# Patient Record
Sex: FEMALE | Race: WHITE | URBAN - METROPOLITAN AREA
[De-identification: names, ages, dates, MRNs, and addresses within clinical notes are randomized per-mention and may not be internally consistent; named-entity substitution may affect disease eponyms.]

---

## 2017-02-28 ENCOUNTER — TELEPHONE (OUTPATIENT)
Dept: FAMILY MEDICINE | Facility: CLINIC | Age: 32
End: 2017-02-28

## 2017-02-28 NOTE — TELEPHONE ENCOUNTER
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 The letter should state the date that she started the medication.   Please fax to [facility] attn: Og Mendoza [phone].

## 2017-02-28 NOTE — TELEPHONE ENCOUNTER
Patient calling, she would rather come in and pick it up instead of having the letter faxed. Please call when ready.

## 2017-02-28 NOTE — LETTER
[facility]  [address] [facility] [id number] [phone]      RE: Abigail Lopez [birth year]      To Whom it May Concern:      Abigail Lopez is a patient of mine at [facility]. She is taking effexor for treatment of anxiety and depression. She started taking this medication in 2014 and it has not caused impairment with her driving. I advise she continue taking this for mood stabilization.         Sincerely,         Kristen Kehr PA-C

## 2017-02-28 NOTE — LETTER
[facility] [place]  [address] [facility] [id number] [phone]      RE: Abigail Lopez [birth year]      To Whom it May Concern:                    Sincerely,         Kristen Kehr PA-C

## 2017-03-13 ENCOUNTER — TELEPHONE (OUTPATIENT)
Dept: FAMILY MEDICINE | Facility: CLINIC | Age: 32
End: 2017-03-13

## 2017-03-13 NOTE — LETTER
[facility]                                                   [address] [place]  [address]    March 20, 2017    Abigail Lopez  [address]   [place] [address]        Dear Abigail,       After reviewing your chart, I have determined you are due for a PHQ-9 questionnaire. The PHQ-9 is a nine question survey tool that helps us determine how your depression is doing based on the last two weeks. Please complete the questionnaire and return in the envelope provided.          Thank you,   [place]hell  [phone]

## 2017-03-13 NOTE — TELEPHONE ENCOUNTER
PHQ-9 due now for patient ( 5-7 months from index date)  Index date:       9-28-16  Index PHQ9 :   12  FU start date:   2-28-17  FU End date :   4-28-17    RN- Contact patient for PHQ-9. Remission considered if follow up PHQ-9 less than 5.  If greater than 5 consider follow up appointment, e-visit for medication follow up and evaluation.

## 2017-03-14 NOTE — TELEPHONE ENCOUNTER
Left message on answering machine for patient/parent to call back.   [phone].  Yvette Avila RN

## 2017-03-17 NOTE — TELEPHONE ENCOUNTER
Left message on answering machine for patient/parent to call back.   [phone].  Yvette Avila RN

## 2017-03-20 NOTE — TELEPHONE ENCOUNTER
Patient has not responded to MyChart and multiple phone calls.  Please send letter for patient to complete PHQ 9, then postpone letter for 2 weeks.   Thank you, Yvette Avila RN

## 2017-04-12 ENCOUNTER — TELEPHONE (OUTPATIENT)
Dept: FAMILY MEDICINE | Facility: CLINIC | Age: 32
End: 2017-04-12

## 2017-04-12 NOTE — TELEPHONE ENCOUNTER
Left message on answering machine for patient/parent to call back.   [phone].  Yvette Avila RN

## 2017-04-13 NOTE — TELEPHONE ENCOUNTER
Left message on answering machine for patient/parent to call back.   [phone].  Yvette Avila RN

## 2017-10-01 ENCOUNTER — HEALTH MAINTENANCE LETTER (OUTPATIENT)
Age: 32
End: 2017-10-01

## 2018-02-08 ENCOUNTER — OFFICE VISIT (OUTPATIENT)
Dept: FAMILY MEDICINE | Facility: CLINIC | Age: 33
End: 2018-02-08
Payer: COMMERCIAL

## 2018-02-08 VITALS
SYSTOLIC BLOOD PRESSURE: 132 MMHG | HEIGHT: 67 IN | WEIGHT: 293 LBS | OXYGEN SATURATION: 100 % | DIASTOLIC BLOOD PRESSURE: 88 MMHG | HEART RATE: 111 BPM | TEMPERATURE: 98.3 F | BODY MASS INDEX: 45.99 KG/M2

## 2018-02-08 DIAGNOSIS — E66.01 MORBID OBESITY DUE TO EXCESS CALORIES (H): Primary | ICD-10-CM

## 2018-02-08 DIAGNOSIS — I10 ESSENTIAL HYPERTENSION WITH GOAL BLOOD PRESSURE LESS THAN 140/90: ICD-10-CM

## 2018-02-08 LAB
ALBUMIN SERPL-MCNC: 3.6 G/DL (ref 3.4–5)
ALP SERPL-CCNC: 82 U/L (ref 40–150)
ALT SERPL W P-5'-P-CCNC: 34 U/L (ref 0–50)
ANION GAP SERPL CALCULATED.3IONS-SCNC: 6 MMOL/L (ref 3–14)
AST SERPL W P-5'-P-CCNC: 18 U/L (ref 0–45)
BILIRUB SERPL-MCNC: 0.3 MG/DL (ref 0.2–1.3)
BUN SERPL-MCNC: 13 MG/DL (ref 7–30)
CALCIUM SERPL-MCNC: 8.5 MG/DL (ref 8.5–10.1)
CHLORIDE SERPL-SCNC: 105 MMOL/L (ref 94–109)
CO2 SERPL-SCNC: 27 MMOL/L (ref 20–32)
CREAT SERPL-MCNC: 0.8 MG/DL (ref 0.52–1.04)
GFR SERPL CREATININE-BSD FRML MDRD: 81 ML/MIN/1.7M2
GLUCOSE SERPL-MCNC: 84 MG/DL (ref 70–99)
POTASSIUM SERPL-SCNC: 4.2 MMOL/L (ref 3.4–5.3)
PROT SERPL-MCNC: 8 G/DL (ref 6.8–8.8)
SODIUM SERPL-SCNC: 138 MMOL/L (ref 133–144)
T4 FREE SERPL-MCNC: 0.99 NG/DL (ref 0.76–1.46)
TSH SERPL DL<=0.005 MIU/L-ACNC: 3.67 MU/L (ref 0.4–4)

## 2018-02-08 PROCEDURE — 84443 ASSAY THYROID STIM HORMONE: CPT | Performed by: PHYSICIAN ASSISTANT

## 2018-02-08 PROCEDURE — 80053 COMPREHEN METABOLIC PANEL: CPT | Performed by: PHYSICIAN ASSISTANT

## 2018-02-08 PROCEDURE — 84439 ASSAY OF FREE THYROXINE: CPT | Performed by: PHYSICIAN ASSISTANT

## 2018-02-08 PROCEDURE — 99214 OFFICE O/P EST MOD 30 MIN: CPT | Performed by: PHYSICIAN ASSISTANT

## 2018-02-08 PROCEDURE — 36415 COLL VENOUS BLD VENIPUNCTURE: CPT | Performed by: PHYSICIAN ASSISTANT

## 2018-02-08 RX ORDER — LISINOPRIL 10 MG/1
10 TABLET ORAL DAILY
Qty: 90 TABLET | Refills: 0 | Status: SHIPPED | OUTPATIENT
Start: 2018-02-08 | End: 2018-02-08

## 2018-02-08 RX ORDER — LISINOPRIL 20 MG/1
20 TABLET ORAL DAILY
Qty: 90 TABLET | Refills: 1 | Status: SHIPPED | OUTPATIENT
Start: 2018-02-08 | End: 2018-12-31

## 2018-02-08 ASSESSMENT — ANXIETY QUESTIONNAIRES
6. BECOMING EASILY ANNOYED OR IRRITABLE: MORE THAN HALF THE DAYS
1. FEELING NERVOUS, ANXIOUS, OR ON EDGE: MORE THAN HALF THE DAYS
GAD7 TOTAL SCORE: 10
IF YOU CHECKED OFF ANY PROBLEMS ON THIS QUESTIONNAIRE, HOW DIFFICULT HAVE THESE PROBLEMS MADE IT FOR YOU TO DO YOUR WORK, TAKE CARE OF THINGS AT HOME, OR GET ALONG WITH OTHER PEOPLE: SOMEWHAT DIFFICULT
2. NOT BEING ABLE TO STOP OR CONTROL WORRYING: MORE THAN HALF THE DAYS
7. FEELING AFRAID AS IF SOMETHING AWFUL MIGHT HAPPEN: SEVERAL DAYS
5. BEING SO RESTLESS THAT IT IS HARD TO SIT STILL: NOT AT ALL
3. WORRYING TOO MUCH ABOUT DIFFERENT THINGS: MORE THAN HALF THE DAYS

## 2018-02-08 ASSESSMENT — PATIENT HEALTH QUESTIONNAIRE - PHQ9: 5. POOR APPETITE OR OVEREATING: SEVERAL DAYS

## 2018-02-08 NOTE — PROGRESS NOTES
"  SUBJECTIVE:                                                    Abigail Lopez is a 33 year old female who presents to clinic today for the following health issues:      1)  Hypertension Follow-up      Outpatient blood pressures are not being checked.    Low Salt Diet: no added salt      Amount of exercise or physical activity: 4-5 days/week for an average of 15-30 minutes    Problems taking medications regularly: No    Medication side effects: none    Diet: regular (no restrictions)    On lisinopril 10 mg.   Not fasting. ,will get kidney panel today.   Diastolic has been in high 80s.  Has not been to goal. No chest pain, shortness of breath, edema, PND, or orthopnea. No dizziness or vision changes. No side effects from medications.     2)  Discuss weight loss options. Patient has HTN and would not be candidate for phentermine with me. She is most interested in bariatric surgery. Has been \"fat\" all her life per patient. Had thyroid issues but then was told she didn't, would like hormone re-tested.  We will get that today.   Walks but no formal exercise.     3)  Due for colp (never scheduled, lost insurance, had abnormal pap) and would like to schedule removal of implanon.  H/o HR HPV.  We will get her schedule with obgyn now that she has insurance she is willing to get this done.           Problem list and histories reviewed & adjusted, as indicated.  Additional history: as documented    Patient Active Problem List   Diagnosis     BMI 50.0-59.9, adult (H)     Tobacco use disorder     Mild major depression (H)     Allergic rhinitis     Irregular menses     Dysmenorrhea     Menorrhagia     Health Care Home (Not Active)      CARDIOVASCULAR SCREENING; LDL GOAL LESS THAN 160     Morbid obesity (H)     Depression with anxiety     Hypertension goal BP (blood pressure) < 140/90     Morbid obesity due to excess calories (H)     Cervical high risk HPV (human papillomavirus) test positive     Past Surgical History: " "  Procedure Laterality Date     NO HISTORY OF SURGERY         Social History   Substance Use Topics     Smoking status: Current Every Day Smoker     Packs/day: 0.25     Smokeless tobacco: Never Used      Comment: soical smoker     Alcohol use Yes     Family History   Problem Relation Age of Onset     Breast Cancer Mother      Eye Disorder Mother      Depression Mother      depression and anxiety     Thyroid Disease Mother      CEREBROVASCULAR DISEASE Maternal Grandmother      CANCER Maternal Grandmother      HEART DISEASE Maternal Grandfather      Asthma Brother      Eye Disorder Brother      Depression Brother      Coronary Artery Disease Other          Current Outpatient Prescriptions   Medication Sig Dispense Refill     lisinopril (PRINIVIL/ZESTRIL) 20 MG tablet Take 1 tablet (20 mg) by mouth daily Note increase in dose 90 tablet 1     [DISCONTINUED] lisinopril (PRINIVIL/ZESTRIL) 10 MG tablet Take 1 tablet (10 mg) by mouth daily 90 tablet 0     [DISCONTINUED] lisinopril (PRINIVIL,ZESTRIL) 10 MG tablet Take 1 tablet (10 mg) by mouth daily Need to keep upcoming appointment for further refills 90 tablet 0     ketotifen (ZADITOR) 0.025 % SOLN Place 1 drop into both eyes every 12 hours (Patient not taking: Reported on 2/8/2018) 1 Bottle 11     Allergies   Allergen Reactions     Polymyxin B Visual Disturbance     Eyes were burning and red       ROS:  Constitutional, HEENT, cardiovascular, pulmonary, GI, , musculoskeletal, neuro, skin, endocrine and psych systems are negative, except as otherwise noted.    OBJECTIVE:     /88  Pulse 111  Temp 98.3  F (36.8  C) (Oral)  Ht 5' 7\" (1.702 m)  Wt (!) 383 lb (173.7 kg)  SpO2 100%  BMI 59.99 kg/m2  Body mass index is 59.99 kg/(m^2).  GENERAL: alert, no distress and obese  RESP: lungs clear to auscultation - no rales, rhonchi or wheezes  CV: regular rate and rhythm, normal S1 S2, no S3 or S4, no murmur, click or rub, no peripheral edema and peripheral pulses " strong  MS: no gross musculoskeletal defects noted, no edema  NEURO: Normal strength and tone, mentation intact and speech normal  PSYCH: mentation appears normal, affect normal/bright        ASSESSMENT/PLAN:     ASSESSMENT / PLAN:  (E66.01) Morbid obesity due to excess calories (H)  (primary encounter diagnosis)  Comment:   Plan: TSH, T4 free            (I10) Essential hypertension with goal blood pressure less than 140/90  Comment:   Plan: Comprehensive metabolic panel, lisinopril         (PRINIVIL/ZESTRIL) 20 MG tablet, DISCONTINUED:         lisinopril (PRINIVIL/ZESTRIL) 10 MG tablet          Not to goal, increase medication, recheck 3 months in clinic      (Z68.43) BMI 50.0-59.9, adult (H)  Comment:   Plan: BARIATRIC ADULT REFERRAL                  Seble Anthony PA-C  [facility]

## 2018-02-08 NOTE — NURSING NOTE
"Chief Complaint   Patient presents with     Hypertension     BP med check      Weight Loss     discuss weight loss options        Initial BP (!) 148/92  Pulse 111  Temp 98.3  F (36.8  C) (Oral)  Ht 5' 7\" (1.702 m)  Wt (!) 383 lb (173.7 kg)  SpO2 100%  BMI 59.99 kg/m2 Estimated body mass index is 59.99 kg/(m^2) as calculated from the following:    Height as of this encounter: 5' 7\" (1.702 m).    Weight as of this encounter: 383 lb (173.7 kg).  Medication Reconciliation: complete      Florin Catherine MA    "

## 2018-02-08 NOTE — MR AVS SNAPSHOT
After Visit Summary   2/8/2018    Abigail Lopez    MRN: [id number]           Patient Information     Date Of Birth          1985        Visit Information        Provider Department      2/8/2018 10:40 AM Seble Anthony PA-C [facility]        Today's Diagnoses     Morbid obesity due to excess calories (H)    -  1    Essential hypertension with goal blood pressure less than 140/90        BMI 50.0-59.9, adult (H)        Mild major depression (H)           Follow-ups after your visit        Additional Services     BARIATRIC ADULT REFERRAL       Your provider [facility] [phone]. [email or web address]    Please be aware that coverage of these services is subject to the terms and limitations of your health insurance plan.  Call member services at your health plan with any benefit or coverage questions.      Please bring the following with you to your appointment:      (1) List of current medications   (2) This referral request   (3) Any documents/labs given to you for this referral                  Who to contact     If you have questions or need follow up information about today's clinic visit or your schedule please contact [facility] directly at [phone].  Normal or non-critical lab and imaging results will be communicated to you by MyChart, letter or phone within 4 business days after the clinic has received the results. If you do not hear from us within 7 days, please contact the clinic through MyChart or phone. If you have a critical or abnormal lab result, we will notify you by phone as soon as possible.  Submit refill requests through [facility] or call your pharmacy and they will forward the refill request to us. Please allow 3 business days for your refill to be completed.          Additional Information About Your  "Visit        [employer] Information     [employer] gives you secure access to your electronic health record. If you see a primary care provider, you can also send messages to your care team and make appointments. If you have questions, please call your primary care clinic.  If you do not have a primary care provider, please call [phone] and they will assist you.        Care EveryWhere ID     This is your Care EveryWhere ID. This could be used by other organizations to access your [place] medical records  [id number]        Your Vitals Were     Pulse Temperature Height Pulse Oximetry BMI (Body Mass Index)       111 98.3  F (36.8  C) (Oral) 5' 7\" (1.702 m) 100% 59.99 kg/m2        Blood Pressure from Last 3 Encounters:   02/08/18 132/88   12/06/16 126/86   11/21/16 (!) 136/96    Weight from Last 3 Encounters:   02/08/18 (!) 383 lb (173.7 kg)   11/21/16 (!) 363 lb (164.7 kg)   09/28/16 (!) 351 lb (159.2 kg)              We Performed the Following     BARIATRIC ADULT REFERRAL     Comprehensive metabolic panel     DEPRESSION ACTION PLAN (DAP)     T4 free     TSH          Today's Medication Changes          These changes are accurate as of 2/8/18 11:00 AM.  If you have any questions, ask your nurse or doctor.               These medicines have changed or have updated prescriptions.        Dose/Directions    lisinopril 20 MG tablet   Commonly known as:  PRINIVIL/ZESTRIL   This may have changed:    - medication strength  - how much to take  - additional instructions   Used for:  Essential hypertension with goal blood pressure less than 140/90   Changed by:  Seble Anthony PA-C        Dose:  20 mg   Take 1 tablet (20 mg) by mouth daily Note increase in dose   Quantity:  90 tablet   Refills:  1            Where to get your medicines      These medications were sent to [facility] [address], [address]  [address], [facility] [address] [address]     Phone:  [phone]     " lisinopril 20 MG tablet                Primary Care Provider Office Phone # Fax #    Seble Elias Anthony PA-C [phone]       [address] [facility] [address]        Equal Access to Services     PAULINE RODRIGUEZ : Hadii nati ku hadradhao Soomaali, waaxda luqadaha, qaybta kaalmada adeegyada, esthela pierren adeadryan joseph laNisreenamelia root. So [facility] [phone].    ATENCIÓN: Si habla español, tiene a stone disposición servicios gratuitos de asistencia lingüística. Llame al [phone].    We comply with applicable federal civil rights laws and [place] laws. We do not discriminate on the basis of race, color, national origin, age, disability, sex, sexual orientation, or gender identity.            Thank you!     Thank you for choosing [facility]  for your care. Our goal is always to provide you with excellent care. Hearing back from our patients is one way we can continue to improve our services. Please take a few minutes to complete the written survey that you may receive in the mail after your visit with us. Thank you!             Your Updated Medication List - Protect others around you: Learn how to safely use, store and throw away your medicines at www.disposemymeds.org.          This list is accurate as of 2/8/18 11:00 AM.  Always use your most recent med list.                   Brand Name Dispense Instructions for use Diagnosis    ketotifen 0.025 % Soln ophthalmic solution    ZADITOR    1 Bottle    Place 1 drop into both eyes every 12 hours    Allergic conjunctivitis, bilateral       lisinopril 20 MG tablet    PRINIVIL/ZESTRIL    90 tablet    Take 1 tablet (20 mg) by mouth daily Note increase in dose    Essential hypertension with goal blood pressure less than 140/90

## 2018-02-09 ASSESSMENT — PATIENT HEALTH QUESTIONNAIRE - PHQ9: SUM OF ALL RESPONSES TO PHQ QUESTIONS 1-9: 12

## 2018-02-09 ASSESSMENT — ANXIETY QUESTIONNAIRES: GAD7 TOTAL SCORE: 10

## 2018-02-13 ENCOUNTER — OFFICE VISIT (OUTPATIENT)
Dept: OBGYN | Facility: CLINIC | Age: 33
End: 2018-02-13
Payer: COMMERCIAL

## 2018-02-13 VITALS
HEART RATE: 104 BPM | TEMPERATURE: 98.7 F | DIASTOLIC BLOOD PRESSURE: 82 MMHG | BODY MASS INDEX: 59.67 KG/M2 | WEIGHT: 293 LBS | SYSTOLIC BLOOD PRESSURE: 129 MMHG

## 2018-02-13 DIAGNOSIS — Z30.46 NEXPLANON REMOVAL: Primary | ICD-10-CM

## 2018-02-13 PROCEDURE — 11982 REMOVE DRUG IMPLANT DEVICE: CPT | Performed by: NURSE PRACTITIONER

## 2018-02-13 NOTE — PROGRESS NOTES
Abigail Lopez is a 33 year old female  No LMP recorded. Patient has had an injection. After nearly 3 years of Nexplanon as a contraceptive, she was here today for its removal. Patient likely wants a new implant, but not quite yet. Wants to take a short break. Planning bariatric surgery later this year and will need 2 forms of contraception. Partner has had a vasectomy, so she is not in need of contraception right now. Patient had an abnormal pap smear in 2016, was to have a colposcopy and then was lost to follow up. Has insurance now and is scheduled for a colposcopy later this month. Prefers to just have the colposcopy done rather than repeat the pap smear first. Patient medical, surgical, social, and family history reviewed and updated. ROS: 10 point ROS neg other than the symptoms noted above in the HPI.    Abigail was counseled about removal. She voiced her understanding and informed consent was obtained.    /82 (Cuff Size: Adult Large)  Pulse 104  Temp 98.7  F (37.1  C) (Oral)  Wt (!) 381 lb (172.8 kg)  BMI 59.67 kg/m2   This is a well appearing female in no acute distress. Answers questions and maintains eye contact appropriately.     PROCEDURE:  Patient was placed in a supine position. Right arm was flexed at the elbow and externally rotated. The implant was located by palpation and marked at the end closest to the elbow with a sterile marker.     The area was cleaned and antiseptic applied. 1cc of 1% Lidocaine was injected just underneath the end of the implant closest to the elbow.  Light pressure was applied on the end of the implant closest to the elbow and a 2 mm incision was made in the longitudinal direction of the arm at the tip of the implant closest to the elbow. The implant was gently pushed toward the incision until the tip was visible and was grasped with sterile mosquito foreceps and gently removed.  The incision was closed with a butterfly closure and an adhesive bandage  applied. A sterile gauze with pressure bandage was also applied.    Aseptic conditions were maintained throughout the procedure. The patient tolerated the procedure well.  No apparent complications. Follow up as needed.  Patient will follow up if she chooses to have a new implant inserted, aware of timing in her cycle that is optimal for insertion.    Mariaelena WOODARD CNP

## 2018-02-13 NOTE — MR AVS SNAPSHOT
After Visit Summary   2/13/2018    Abigail Lopez    MRN: [id number]           Patient Information     Date Of Birth          1985        Visit Information        Provider Department      2/13/2018 10:00 AM Mariaelena Bucio APRN CNP; [place] PROCEDURE ROOM 1 [facility]        Today's Diagnoses     Nexplanon removal    -  1       Follow-ups after your visit        Your next 10 appointments already scheduled     Feb 28, 2018  9:55 AM CST   PROCEDURE with Shonda Gatica MD, [place] PROCEDURE ROOM 1   [facility] ([facility])    [address] [facility] [address]   [phone]            Mar 21, 2018 10:00 AM CDT   (Arrive by 9:45 AM)   New Bariatric Surgery Consult with MARIAJOSE Guzmán [facility] ([facility])    [address] [place] [id number] [phone]            Mar 21, 2018 10:30 AM CDT   (Arrive by 10:15 AM)   NUTRITION VISIT with TERESA Castle [facility] ([facility])    [address] [place] [id number] [phone]              Who to contact     If you have questions or need follow up information about today's clinic visit or your schedule please contact [facility] directly at [phone].  Normal or non-critical lab and imaging results will be communicated to you by MyChart, letter or phone within 4 business days after the clinic has received the results. If you do not hear from us within 7 days, please contact the clinic through MyChart or phone. If you have a critical or abnormal lab result, we will notify you by phone as soon as possible.  Submit refill requests through [employer] or call your pharmacy and they will forward the refill request to us. Please allow 3 business days for your refill to be completed.          Additional  Information About Your Visit        [employer] Information     [employer] gives you secure access to your electronic health record. If you see a primary care provider, you can also send messages to your care team and make appointments. If you have questions, please call your primary care clinic.  If you do not have a primary care provider, please call [phone] and they will assist you.        Care EveryWhere ID     This is your Care EveryWhere ID. This could be used by other organizations to access your [place] medical records  [id number]        Your Vitals Were     Pulse Temperature BMI (Body Mass Index)             104 98.7  F (37.1  C) (Oral) 59.67 kg/m2          Blood Pressure from Last 3 Encounters:   02/13/18 129/82   02/08/18 132/88   12/06/16 126/86    Weight from Last 3 Encounters:   02/13/18 (!) 381 lb (172.8 kg)   02/08/18 (!) 383 lb (173.7 kg)   11/21/16 (!) 363 lb (164.7 kg)              We Performed the Following     REMOVAL [facility]        Primary Care Provider Office Phone # Fax #    Seble Anthony PA-C [phone] [address] [facility] [address]        Equal Access to Services     PAULINE RODRIGUEZ : Hadii aad ku hadasho Soomaali, waaxda luqadaha, qaybta kaalmada adeegyada, waxay kristinein hayjudithn micah jett . So [facility] [phone].    ATENCIÓN: Si habla español, tiene a stone disposición servicios gratuitos de asistencia lingüística. Llame al [phone].    We comply with applicable federal civil rights laws and [place] laws. We do not discriminate on the basis of race, color, national origin, age, disability, sex, sexual orientation, or gender identity.            Thank you!     Thank you for choosing [facility]  for your care. Our goal is always to provide you with excellent care. Hearing back from our patients is one way we can continue to improve our services. Please take a few minutes to complete the written survey that you may receive in  the mail after your visit with us. Thank you!             Your Updated Medication List - Protect others around you: Learn how to safely use, store and throw away your medicines at www.disposemymeds.org.          This list is accurate as of 2/13/18 10:48 AM.  Always use your most recent med list.                   Brand Name Dispense Instructions for use Diagnosis    ketotifen 0.025 % Soln ophthalmic solution    ZADITOR    1 Bottle    Place 1 drop into both eyes every 12 hours    Allergic conjunctivitis, bilateral       lisinopril 20 MG tablet    PRINIVIL/ZESTRIL    90 tablet    Take 1 tablet (20 mg) by mouth daily Note increase in dose    Essential hypertension with goal blood pressure less than 140/90

## 2018-02-13 NOTE — NURSING NOTE
"Chief Complaint   Patient presents with     Procedure     Nexplanon removal       Initial /82 (Cuff Size: Adult Large)  Pulse 104  Temp 98.7  F (37.1  C) (Oral)  Wt (!) 381 lb (172.8 kg)  BMI 59.67 kg/m2 Estimated body mass index is 59.67 kg/(m^2) as calculated from the following:    Height as of 2/8/18: 5' 7\" (1.702 m).    Weight as of this encounter: 381 lb (172.8 kg).  Medication Reconciliation: complete    Yessy Tomas LPN    "

## 2018-02-26 NOTE — TELEPHONE ENCOUNTER
APPT INFO    Date /Time: 3/15/18 at 11AM   Reason for Appt: NBS   Ref Provider/Clinic: Seble Anthony   Are there internal records? Yes/No?  IF YES, list clinic names: [facility]   Are there outside records? Yes/No? No   Patient Contact (Y/N) & Call Details: No   Action: Chart reviewed

## 2018-02-27 NOTE — PROGRESS NOTES
S:  Abigail Lopez is a 33 year old female here for a colposcopy.  She was referred to me by Seble Anthony .  Last 3 Pap and HPV Results:   PAP / HPV Latest Ref Rng & Units 11/21/2016 8/23/2013   PAP - NIL NIL   HPV 16 DNA NEG Negative -   HPV 18 DNA NEG Positive(A) -   OTHER HR HPV NEG Positive(A) -       Cervical risk factors: Tobacco Use yes      Previous STD none      Previous dysplasia: yes      Previous colposcopy: yes:  date 4/06.       Previous treatment:  none      Current birth control: none  No LMP recorded. Patient has had an injection.  I discussed the history of HPV and the risk of dysplasia/cancer.  I explained the indications for the colposcopy and the procedure in detail.  I discussed the potential risks including bleeding, infection and cramping. I have recommend abstinence for 2 weeks and no vigorous activity for 48 hours.  Consent form was signed by patient and all questions were answered.    PMH/PSH/Meds/All were reviewed and updated at this visit.      O:  Vitals noted.  Patient alert, oriented, and in no acute distress. She was placed on the exam table in the dorsal position and a sterile speculum inserted into the vagina. The cervix was easily visualized.  Acetic acid was applied to better visualize the transformation zone.  Colposcopy was performed in the usual fashion.     Unenhanced examination of the cervix was normal without lesions    Pap repeated?:  Yes with HPV  SCJ seen?:  no  Endocervical speculum needed?:  No  ECC done?:  Yes   Lugol's solution used?:  Yes   Satisfactory examination?:  No - SCJ not seen and sidewalls obstruct view due to obesity    Vaginal vault: normal to cursory inspection   Urethra normal?:  yes  Labia normal?:  yes  Perineum normal?:  yes    FINDINGS:  Please see image   Cervix: no visible lesions  Procedure: biopsies taken (not including ECC): 0.    Procedure summary: Patient tolerated procedure well       A:  Colposcopy of the cervix and  upper/adjacent vagina Normal exam without visible pathology with ECC.      P:  Will await pathology results, plan pap/hpv in 1 year unless CIN2 or greater then gyn referral        Copy of chart forwarded to primary care provider.

## 2018-02-28 ENCOUNTER — OFFICE VISIT (OUTPATIENT)
Dept: FAMILY MEDICINE | Facility: CLINIC | Age: 33
End: 2018-02-28
Payer: COMMERCIAL

## 2018-02-28 VITALS
TEMPERATURE: 97 F | BODY MASS INDEX: 45.99 KG/M2 | HEART RATE: 108 BPM | HEIGHT: 67 IN | SYSTOLIC BLOOD PRESSURE: 132 MMHG | DIASTOLIC BLOOD PRESSURE: 86 MMHG | OXYGEN SATURATION: 98 % | RESPIRATION RATE: 20 BRPM | WEIGHT: 293 LBS

## 2018-02-28 DIAGNOSIS — R87.810 CERVICAL HIGH RISK HPV (HUMAN PAPILLOMAVIRUS) TEST POSITIVE: ICD-10-CM

## 2018-02-28 PROCEDURE — 87624 HPV HI-RISK TYP POOLED RSLT: CPT | Performed by: FAMILY MEDICINE

## 2018-02-28 PROCEDURE — G0476 HPV COMBO ASSAY CA SCREEN: HCPCS | Performed by: FAMILY MEDICINE

## 2018-02-28 PROCEDURE — 88305 TISSUE EXAM BY PATHOLOGIST: CPT | Mod: 26 | Performed by: FAMILY MEDICINE

## 2018-02-28 PROCEDURE — 57456 ENDOCERV CURETTAGE W/SCOPE: CPT | Performed by: FAMILY MEDICINE

## 2018-02-28 PROCEDURE — 88305 TISSUE EXAM BY PATHOLOGIST: CPT | Performed by: FAMILY MEDICINE

## 2018-02-28 PROCEDURE — 88175 CYTOPATH C/V AUTO FLUID REDO: CPT | Performed by: FAMILY MEDICINE

## 2018-02-28 ASSESSMENT — PAIN SCALES - GENERAL: PAINLEVEL: MILD PAIN (3)

## 2018-02-28 NOTE — MR AVS SNAPSHOT
After Visit Summary   2/28/2018    Abigail Lopez    MRN: [id number]           Patient Information     Date Of Birth          1985        Visit Information        Provider Department      2/28/2018 9:55 AM Shonda Gatica MD; [place] PROCEDURE ROOM 1 [facility]        Today's Diagnoses     Cervical high risk HPV (human papillomavirus) test positive          Care Instructions    Nothing in the vagina (no sex/tampons) for 2 weeks.  Monitor for symptoms of infection to include a foul smelling discharge, abdominal tenderness or fever without other explanation.  Monitor for heavy bleeding (soaking a maxi pad every hour for 2-3 hours).            Follow-ups after your visit        Your next 10 appointments already scheduled     Mar 15, 2018 11:00 AM CDT   (Arrive by 10:45 AM)   New Bariatric Surgery Consult with MARIAJOSE Guzmán [facility] ([facility])    [address] [place] [address] [phone]            Mar 15, 2018  1:00 PM CDT   (Arrive by 12:45 PM)   NUTRITION VISIT with TERESA Castle [facility] Surgical [facility] Management ([facility])    [address] [place] [address] [phone]              Who to contact     If you have questions or need follow up information about today's clinic visit or your schedule please contact [facility] directly at [phone].  Normal or non-critical lab and imaging results will be communicated to you by MyChart, letter or phone within 4 business days after the clinic has received the results. If you do not hear from us within 7 days, please contact the clinic through MyChart or phone. If you have a critical or abnormal lab result, we will notify you by phone as soon as possible.  Submit refill requests through [employer] or call your pharmacy and they will forward the refill  "request to us. Please allow 3 business days for your refill to be completed.          Additional Information About Your Visit        [employer] Information     [employer] gives you secure access to your electronic health record. If you see a primary care provider, you can also send messages to your care team and make appointments. If you have questions, please call your primary care clinic.  If you do not have a primary care provider, please call [phone] and they will assist you.        Care EveryWhere ID     This is your Care EveryWhere ID. This could be used by other organizations to access your [place] medical records  [id number]        Your Vitals Were     Pulse Temperature Respirations Height Pulse Oximetry BMI (Body Mass Index)    108 97  F (36.1  C) (Oral) 20 5' 7\" (1.702 m) 98% 60.14 kg/m2       Blood Pressure from Last 3 Encounters:   02/28/18 132/86   02/13/18 129/82   02/08/18 132/88    Weight from Last 3 Encounters:   02/28/18 (!) 384 lb (174.2 kg)   02/13/18 (!) 381 lb (172.8 kg)   02/08/18 (!) 383 lb (173.7 kg)              Today, you had the following     No orders found for display       Primary Care Provider Office Phone # Fax #    Seble Anthony PA-C [phone] [address] [facility] [address]        Equal Access to Services     [facility] : Hadii aad ku hadasho Soomaali, waaxda luqadaha, qaybta kaalmada adeegyada, esthela jett . So [place] -[phone].    ATENCIÓN: Si habla español, tiene a stone disposición servicios gratuitos de asistencia lingüística. Llame al [phone].    We comply with applicable federal civil rights laws and [place] laws. We do not discriminate on the basis of race, color, national origin, age, disability, sex, sexual orientation, or gender identity.            Thank you!     Thank you for choosing [facility]  for your care. Our goal is always to provide you with excellent care. Hearing back " from our patients is one way we can continue to improve our services. Please take a few minutes to complete the written survey that you may receive in the mail after your visit with us. Thank you!             Your Updated Medication List - Protect others around you: Learn how to safely use, store and throw away your medicines at www.disposemymeds.org.          This list is accurate as of 2/28/18 10:34 AM.  Always use your most recent med list.                   Brand Name Dispense Instructions for use Diagnosis    ketotifen 0.025 % Soln ophthalmic solution    ZADITOR    1 Bottle    Place 1 drop into both eyes every 12 hours    Allergic conjunctivitis, bilateral       lisinopril 20 MG tablet    PRINIVIL/ZESTRIL    90 tablet    Take 1 tablet (20 mg) by mouth daily Note increase in dose    Essential hypertension with goal blood pressure less than 140/90

## 2018-02-28 NOTE — LETTER
"                                                                          Affirmation of Informed Consent for Surgery or Invasive Procedure    1.  I, (print patient's name) Abigail Lopez,  1985,   a.  Agree that I will have (include both the medical term and patient words):           Chief Complaint   Patient presents with     Colposcopy      b.  At [facility].     c.  The reason for this procedure is (medical condition):  Portia.   d.  This will be done or supervised by:  Shonda Gatica MD.   e.  My doctor may have help from others.  Help could include opening or closing         the wound. Help might also include taking grafts, cutting out tissue,                           implanting devices.  I have been told who will help, if known.     2.  I have talked to my doctor or health care team about:   a.  What the procedure is and what will happen.   b   How it may help me (the benefits).   c.  How it may harm me (the most likely and most serious risks).   d.  The long-term effects the procedure might have.    e.  My other choices for treatment.  The risks and benefits of those choices.    f.   What will likely happen if I say \"no\" to this procedure.    g.  How I might feel right after and how quickly I might be expected to recover.      h.  What medicines will be used to manage pain or sedate me.     3.  I agree that:  (If I do not agree with a statement, I have crossed it out and              initialed next to it.)       a.  I will ask questions.     b.  No one has promised me definite results.    c.  If serious problems are found during the procedure, the treatment may                    change.   d.  If I have \"do not resuscitate\" (DNR) wishes, I have discussed this with my                              doctor and they will be put on hold during the procedure.   e. Students and other appropriate people, approved by the facility, may watch                      the procedure and help with tasks they " are qualified for.                                                    f.  Pictures or videos may be taken. They may be used for medical or                  educational reasons only.       g. Tissues or organs removed from my body as part of the normal course of the                    procedure may be used for research or teaching purposes. They will be                  disposed of with respect.                    h.  If a staff person is exposed to my blood or body fluids, my blood will be drawn                   and tested for HIV and hepatitis.  I understand that by law, the test results will                    go:         -  In my medical record.                         -  To the Employee Occupational Health Service and/or Infection Control                                  at this facility.    -  To the [place] Health officials.                 i.  I may have a blood transfusion: I have talked to my doctor or care team about:    -  Why I may need a blood transfusion.     - The risks, benefits, and side effects of transfusion - and the risks of not        Having one.     -  Blood safety and other options for treatment.        Consent for blood transfusion obtained during a hospital admission is valid for the entire hospital stay.  Consent  for blood transfusion obtained in the clinic setting is valid for a year from the signature date.                                4.  I understand that:   a.  I can change my mind.  If I do, I must tell my doctor or team as soon as                           possible.              b.  The team members may change during the procedure.                c.   The team will double-check who I am.  They will ask me what I am having                         done and the site of the site of the procedure.  This is done for my safety.    My questions have been answered.  I agree to the procedure.  I have made my special needs and instructions known.      Abigail Lopez      2/28/2018 10:06  AM  Patient (or representative)/Relationship to patient             Date  Time    For telephone consent:      2/28/2018  10:06 AM         Date  Time  Print name of patient (or representative)/relationship to patient    Witness:  I have verified that the signature is that of the patient or patient's representative and that this has been signed before the procedure:    NAME:        2/28/2018  10:06 AM         Date  Time  Person verifying patient's name or patient representative's signature     Provider:  I have discussed the procedure and the information stated above with the patient (or the patient's representative) and answered their questions. The patient or their representative consented to the procedure:      Shonda Gatica MD    2/28/2018  10:06 AM  Physician or Provider's Signature(s)   Date  Time       Intepreter (if used):       2/28/2018  10:06 AM                                   Name       Language/Organization Date  Time    Consent for procedure valid for 30 days after patient signature date     [facility]  AFFIRMATION OF INFORMED CONSENT FOR SURGERY OR INVASIVE PROCEDURE               Original - Medical Records

## 2018-02-28 NOTE — NURSING NOTE
"Chief Complaint   Patient presents with     Colposcopy       Initial /86  Pulse 108  Temp 97  F (36.1  C) (Oral)  Resp 20  Ht 5' 7\" (1.702 m)  Wt (!) 384 lb (174.2 kg)  SpO2 98%  BMI 60.14 kg/m2 Estimated body mass index is 60.14 kg/(m^2) as calculated from the following:    Height as of this encounter: 5' 7\" (1.702 m).    Weight as of this encounter: 384 lb (174.2 kg).  Medication Reconciliation: complete  Yancy Cohn CMA    "

## 2018-02-28 NOTE — NURSING NOTE
"Chief Complaint   Patient presents with     Colposcopy       Initial Pulse 108  Temp 97  F (36.1  C) (Oral)  Resp 20  Ht 5' 7\" (1.702 m)  Wt (!) 384 lb (174.2 kg)  SpO2 98%  BMI 60.14 kg/m2 Estimated body mass index is 60.14 kg/(m^2) as calculated from the following:    Height as of this encounter: 5' 7\" (1.702 m).    Weight as of this encounter: 384 lb (174.2 kg).  Medication Reconciliation: complete  Yancy Cohn CMA    "

## 2018-02-28 NOTE — PATIENT INSTRUCTIONS
Nothing in the vagina (no sex/tampons) for 2 weeks.  Monitor for symptoms of infection to include a foul smelling discharge, abdominal tenderness or fever without other explanation.  Monitor for heavy bleeding (soaking a maxi pad every hour for 2-3 hours).

## 2018-03-02 ENCOUNTER — NURSE TRIAGE (OUTPATIENT)
Dept: NURSING | Facility: CLINIC | Age: 33
End: 2018-03-02

## 2018-03-02 LAB
COPATH REPORT: NORMAL
COPATH REPORT: NORMAL
PAP: NORMAL

## 2018-03-03 NOTE — TELEPHONE ENCOUNTER
"Patient reports passing a large amount of blood with her stool today. Also reports having back pain today. Had a colposcopy done on 2-28-18. Said the bleeding is not from her vagina, it is from her rectal area. Denies feeling faint or dizzy.    Protocol and care advice reviewed.   Caller states understanding of the recommended disposition.    Selena Tabor RN/FNA    Reason for Disposition    [1] Blood in the stool AND [2] moderate or large amount of blood    Additional Information    Negative: Shock suspected (e.g., cold/pale/clammy skin, too weak to stand, low BP, rapid pulse)    Negative: Difficult to awaken or acting confused  (e.g., disoriented, slurred speech)    Negative: Passed out (i.e., lost consciousness, collapsed and was not responding)    Negative: [1] Vomiting AND [2] contains red blood or black (\"coffee ground\") material  (Exception: few red streaks in vomit that only happened once)    Negative: Sounds like a life-threatening emergency to the triager    Diarrhea is main symptom    Negative: Shock suspected (e.g., cold/pale/clammy skin, too weak to stand, low BP, rapid pulse)    Negative: Difficult to awaken or acting confused  (e.g., disoriented, slurred speech)    Negative: Sounds like a life-threatening emergency to the triager    Negative: Vomiting also present and worse than the diarrhea    Negative: [1] Blood in stool AND [2] without diarrhea    Negative: [1] SEVERE abdominal pain (e.g., excruciating) AND [2] present > 1 hour    Negative: [1] SEVERE abdominal pain AND [2] age > 60    Protocols used: DIARRHEA-ADULT-, RECTAL BLEEDING-ADULT-    "

## 2018-03-06 LAB
FINAL DIAGNOSIS: NORMAL
HPV HR 12 DNA CVX QL NAA+PROBE: NEGATIVE
HPV16 DNA SPEC QL NAA+PROBE: NEGATIVE
HPV18 DNA SPEC QL NAA+PROBE: NEGATIVE
SPECIMEN DESCRIPTION: NORMAL
SPECIMEN SOURCE CVX/VAG CYTO: NORMAL

## 2018-03-07 ENCOUNTER — TELEPHONE (OUTPATIENT)
Dept: FAMILY MEDICINE | Facility: CLINIC | Age: 33
End: 2018-03-07

## 2018-03-07 ENCOUNTER — RADIANT APPOINTMENT (OUTPATIENT)
Dept: GENERAL RADIOLOGY | Facility: CLINIC | Age: 33
End: 2018-03-07
Attending: PHYSICIAN ASSISTANT
Payer: COMMERCIAL

## 2018-03-07 ENCOUNTER — OFFICE VISIT (OUTPATIENT)
Dept: FAMILY MEDICINE | Facility: CLINIC | Age: 33
End: 2018-03-07
Payer: COMMERCIAL

## 2018-03-07 VITALS
SYSTOLIC BLOOD PRESSURE: 148 MMHG | DIASTOLIC BLOOD PRESSURE: 97 MMHG | BODY MASS INDEX: 60.3 KG/M2 | TEMPERATURE: 97.3 F | HEART RATE: 103 BPM | RESPIRATION RATE: 18 BRPM | OXYGEN SATURATION: 98 % | WEIGHT: 293 LBS

## 2018-03-07 DIAGNOSIS — J01.00 ACUTE MAXILLARY SINUSITIS, RECURRENCE NOT SPECIFIED: ICD-10-CM

## 2018-03-07 DIAGNOSIS — J11.1 INFLUENZA-LIKE ILLNESS: ICD-10-CM

## 2018-03-07 DIAGNOSIS — J18.9 PNEUMONIA OF LEFT LOWER LOBE DUE TO INFECTIOUS ORGANISM: Primary | ICD-10-CM

## 2018-03-07 PROCEDURE — 71046 X-RAY EXAM CHEST 2 VIEWS: CPT | Mod: FY

## 2018-03-07 PROCEDURE — 99214 OFFICE O/P EST MOD 30 MIN: CPT | Performed by: PHYSICIAN ASSISTANT

## 2018-03-07 RX ORDER — ALBUTEROL SULFATE 90 UG/1
2 AEROSOL, METERED RESPIRATORY (INHALATION) EVERY 4 HOURS PRN
Qty: 1 INHALER | Refills: 0 | Status: SHIPPED | OUTPATIENT
Start: 2018-03-07

## 2018-03-07 RX ORDER — BENZONATATE 200 MG/1
200 CAPSULE ORAL 3 TIMES DAILY PRN
Qty: 21 CAPSULE | Refills: 0 | Status: SHIPPED | OUTPATIENT
Start: 2018-03-07 | End: 2018-04-16

## 2018-03-07 RX ORDER — AZITHROMYCIN 250 MG/1
TABLET, FILM COATED ORAL
Qty: 6 TABLET | Refills: 0 | Status: SHIPPED | OUTPATIENT
Start: 2018-03-07 | End: 2018-03-15

## 2018-03-07 ASSESSMENT — ENCOUNTER SYMPTOMS
SHORTNESS OF BREATH: 1
SORE THROAT: 0
CHILLS: 1
WHEEZING: 0
PALPITATIONS: 0
MYALGIAS: 1
GASTROINTESTINAL NEGATIVE: 1
COUGH: 1
DIAPHORESIS: 0
SPUTUM PRODUCTION: 1
CARDIOVASCULAR NEGATIVE: 1
SINUS PAIN: 1
FEVER: 1
EYE PAIN: 0
WEIGHT LOSS: 0
HEMOPTYSIS: 0

## 2018-03-07 NOTE — MR AVS SNAPSHOT
After Visit Summary   3/7/2018    Abigail Lopez    MRN: [id number]           Patient Information     Date Of Birth          1985        Visit Information        Provider Department      3/7/2018 11:20 AM Halle Treviño PA-C [facility]        Today's Diagnoses     Pneumonia of left lower lobe due to infectious organism (H)    -  1    Influenza-like illness        Acute maxillary sinusitis, recurrence not specified           Follow-ups after your visit        Your next 10 appointments already scheduled     Mar 15, 2018 11:00 AM CDT   (Arrive by 10:45 AM)   New Bariatric Surgery Consult with MARIAJOSE Guzmán [facility] ([facility])    [address] [place] [address] [phone]            Mar 15, 2018  1:00 PM CDT   (Arrive by 12:45 PM)   NUTRITION VISIT with Randa Liriano RD   [facility] ([facility])    [address] [place] [address] [phone]              Who to contact     If you have questions or need follow up information about today's clinic visit or your schedule please contact [facility] directly at [phone].  Normal or non-critical lab and imaging results will be communicated to you by [employer], letter or phone within 4 business days after the clinic has received the results. If you do not hear from us within 7 days, please contact the clinic through [employer] or phone. If you have a critical or abnormal lab result, we will notify you by phone as soon as possible.  Submit refill requests through [employer] or call your pharmacy and they will forward the refill request to us. Please allow 3 business days for your refill to be completed.          Additional Information About Your Visit        [employer] Information     [employer] gives you secure access to your electronic health record. If you  see a primary care provider, you can also send messages to your care team and make appointments. If you have questions, please call your primary care clinic.  If you do not have a primary care provider, please call [phone] and they will assist you.        Care EveryWhere ID     This is your Care EveryWhere ID. This could be used by other organizations to access your [place] medical records  [id number]        Your Vitals Were     Pulse Temperature Respirations Pulse Oximetry BMI (Body Mass Index)       103 97.3  F (36.3  C) (Oral) 18 98% 60.3 kg/m2        Blood Pressure from Last 3 Encounters:   03/07/18 (!) 148/97   02/28/18 132/86   02/13/18 129/82    Weight from Last 3 Encounters:   03/07/18 (!) 385 lb (174.6 kg)   02/28/18 (!) 384 lb (174.2 kg)   02/13/18 (!) 381 lb (172.8 kg)              We Performed the Following     XR Chest 2 Views          Today's Medication Changes          These changes are accurate as of 3/7/18 12:00 PM.  If you have any questions, ask your nurse or doctor.               Start taking these medicines.        Dose/Directions    albuterol 108 (90 BASE) MCG/ACT Inhaler   Commonly known as:  PROAIR HFA/PROVENTIL HFA/VENTOLIN HFA   Used for:  Pneumonia of left lower lobe due to infectious organism (H)   Started by:  Halle Treviño PA-C        Dose:  2 puff   Inhale 2 puffs into the lungs every 4 hours as needed for shortness of breath / dyspnea or wheezing   Quantity:  1 Inhaler   Refills:  0       azithromycin 250 MG tablet   Commonly known as:  ZITHROMAX   Used for:  Pneumonia of left lower lobe due to infectious organism (H)   Started by:  Halle Treviño PA-C        2 tablets the first day, then 1 tablet daily for the next 4 days   Quantity:  6 tablet   Refills:  0       benzonatate 200 MG capsule   Commonly known as:  TESSALON   Used for:  Pneumonia of left lower lobe due to infectious organism (H)   Started by:  Halle Treviño PA-C        Dose:  200 mg   Take 1 capsule (200 mg) by  mouth 3 times daily as needed for cough   Quantity:  21 capsule   Refills:  0            Where to get your medicines      These medications were sent to [facility] [address] [facility], [address]  [address] [facility], [facility] [address], [facility] [address]     Phone:  [phone]     albuterol 108 (90 BASE) MCG/ACT Inhaler    azithromycin 250 MG tablet    benzonatate 200 MG capsule                Primary Care Provider Office Phone # Fax #    Seble Anthony PA-C [phone]       [address] [place] [address]        Equal Access to Services     [facility]: Hadii aad ku hadasho Soomaali, waaxda luqadaha, qaybta kaalmada adeegyada, esthela jett . So [facility] [phone].    ATENCIÓN: Si habla español, tiene a stone disposición servicios gratuitos de asistencia lingüística. [facility] [phone].    We comply with applicable federal civil rights laws and [place] laws. We do not discriminate on the basis of race, color, national origin, age, disability, sex, sexual orientation, or gender identity.            Thank you!     Thank you for choosing [facility]  for your care. Our goal is always to provide you with excellent care. Hearing back from our patients is one way we can continue to improve our services. Please take a few minutes to complete the written survey that you may receive in the mail after your visit with us. Thank you!             Your Updated Medication List - Protect others around you: Learn how to safely use, store and throw away your medicines at www.disposemymeds.org.          This list is accurate as of 3/7/18 12:00 PM.  Always use your most recent med list.                   Brand Name Dispense Instructions for use Diagnosis    albuterol 108 (90 BASE) MCG/ACT Inhaler    PROAIR HFA/PROVENTIL HFA/VENTOLIN HFA    1 Inhaler    Inhale 2 puffs into the lungs every 4 hours as needed for shortness of breath / dyspnea or  wheezing    Pneumonia of left lower lobe due to infectious organism (H)       azithromycin 250 MG tablet    ZITHROMAX    6 tablet    2 tablets the first day, then 1 tablet daily for the next 4 days    Pneumonia of left lower lobe due to infectious organism (H)       benzonatate 200 MG capsule    TESSALON    21 capsule    Take 1 capsule (200 mg) by mouth 3 times daily as needed for cough    Pneumonia of left lower lobe due to infectious organism (H)       ketotifen 0.025 % Soln ophthalmic solution    ZADITOR    1 Bottle    Place 1 drop into both eyes every 12 hours    Allergic conjunctivitis, bilateral       lisinopril 20 MG tablet    PRINIVIL/ZESTRIL    90 tablet    Take 1 tablet (20 mg) by mouth daily Note increase in dose    Essential hypertension with goal blood pressure less than 140/90

## 2018-03-07 NOTE — LETTER
[facility]  [address] [facility] [address] [phone]    2018    Re: Abigail Lopez  [address]   [facility] [id number] [phone] (home)     : 1985      To Whom It May Concern:      Abigail Lopez was seen in clinic today and she has not been able to attend work due to her symptoms.  Please feel free to contact me via phone or MyChart if you have any questions or concerns.        Sincerely,        Halle See MARIAJOSE Treviño

## 2018-03-07 NOTE — TELEPHONE ENCOUNTER
Influenza-Like Illness ([facility]) Protocol    Abigail Lopez      Age: 33 year old     YOB: 1985    Are you currently sick or have you had close contact with someone who is currently sick? Daughter and pt is  so exposed at work  Yes, this patient is currently sick.     Adult Clinical Evaluation    Is this patient experiencing ANY of the following?  Unconsciousness or unresponsiveness No   Difficulty breathing or swallowing No   Blue or dusky lips, skin, or nail beds No   Chest pain No   Severe confusion or delirium No   Seizure activity: ongoing or stopped No   Severe dehydration or signs of shock No   Patient sounds very sick on the phone No     Is this patient experiencing ANY of the following?  Fever > 104 or shaking chills No   Wheezing with minimal response to usual wheezing medications or new wheezing yes   Repeated vomiting or diarrhea with signs of dehydration (no urination within last 12 hours) No   Flu-like symptoms that initially improved but returned with fever and a worse cough Yes, feeling better now   Stiff or painful neck On and off   Severe headache No     Does the patient have any of the following?  Measured fever > 100 degrees Yes   Chills or feels very warm to the touch Yes   Cough Yes   Sore throat Yes   Muscle/ body aches Yes   Headaches Yes   Fatigue (tiredness) Yes     Nursing Plan  Scheduled appointment  Pt has had wheezing on and off and severe ha on and off.  Pt was feeling better and now is feeling worse again.  Need to rule out pneumonia, bronchitis, sinus, or other infection with the influenza.  Advised she should not be at work.     Janae Archuleta RN

## 2018-03-07 NOTE — TELEPHONE ENCOUNTER
Patient state she has the flu as she has been sick for 4 days with a headache, fever, congestions, sinus pressure, ear ache and body aches.  Please call.    Thank you.

## 2018-03-07 NOTE — PROGRESS NOTES
SUBJECTIVE:      HPI   Abigail Lopez is a 33 year old female who presents to clinic today for the following health issues:  RESPIRATORY SYMPTOMS    Duration: X 5 days    Description  Productive cough along with shortness of breath but no hemoptysis or wheezing.     Severity: getting worse    Accompanying signs and symptoms:  Also reports nasal congestion along with sinus pain/pressure/HA, fever, chills, ear pain both, fatigue/malaise and myalgias.  No abdominal pain, n/v, constipation, diarrhea, bloody or black tarry stools.    History (predisposing factors): Ill contacts at home.  No pmh of asthma.  Smoker.    Precipitating or alleviating factors: None    Therapies tried and outcome:  Ibuprofen, rest without any relief.      Reviewed PMH.  Patient Active Problem List   Diagnosis     BMI 50.0-59.9, adult (H)     Tobacco use disorder     Allergic rhinitis     Irregular menses     Dysmenorrhea     Menorrhagia     Health Care Home (Not Active)      CARDIOVASCULAR SCREENING; LDL GOAL LESS THAN 160     Morbid obesity (H)     Depression with anxiety     Hypertension goal BP (blood pressure) < 140/90     Morbid obesity due to excess calories (H)     Cervical high risk HPV (human papillomavirus) test positive     Current Outpatient Prescriptions   Medication Sig Dispense Refill     lisinopril (PRINIVIL/ZESTRIL) 20 MG tablet Take 1 tablet (20 mg) by mouth daily Note increase in dose 90 tablet 1     ketotifen (ZADITOR) 0.025 % SOLN Place 1 drop into both eyes every 12 hours (Patient not taking: Reported on 2/28/2018) 1 Bottle 11     Allergies   Allergen Reactions     Polymyxin B Visual Disturbance     Eyes were burning and red       Review of Systems   Constitutional: Positive for chills, fever and malaise/fatigue. Negative for diaphoresis and weight loss.   HENT: Positive for congestion, ear pain and sinus pain. Negative for sore throat.    Eyes: Negative for pain.   Respiratory: Positive for cough, sputum production and  shortness of breath. Negative for hemoptysis and wheezing.    Cardiovascular: Negative.  Negative for chest pain and palpitations.   Gastrointestinal: Negative.    Musculoskeletal: Positive for myalgias.   Skin: Negative.    Endo/Heme/Allergies: Positive for environmental allergies.   All other systems reviewed and are negative.      BP (!) 148/97  Pulse 103  Temp 97.3  F (36.3  C) (Oral)  Resp 18  Wt (!) 385 lb (174.6 kg)  SpO2 98%  BMI 60.3 kg/m2  Physical Exam   Constitutional: She is oriented to person, place, and time and well-developed, well-nourished, and in no distress. No distress.   HENT:   Head: Normocephalic and atraumatic.   Nose: Mucosal edema and rhinorrhea present. No nasal deformity or septal deviation. No epistaxis.  No foreign bodies. Right sinus exhibits maxillary sinus tenderness. Right sinus exhibits no frontal sinus tenderness. Left sinus exhibits maxillary sinus tenderness. Left sinus exhibits no frontal sinus tenderness.   Mouth/Throat: Uvula is midline and oropharynx is clear and moist. No oropharyngeal exudate, posterior oropharyngeal edema, posterior oropharyngeal erythema or tonsillar abscesses.   TMs are intact without any erythema or bulging bilaterally.  Airway is patent.   Eyes: Conjunctivae and EOM are normal. Pupils are equal, round, and reactive to light. No scleral icterus.   Neck: Normal range of motion. Neck supple. No thyromegaly present.   Cardiovascular: Normal rate, regular rhythm, normal heart sounds and intact distal pulses.  Exam reveals no gallop and no friction rub.    No murmur heard.  Pulmonary/Chest: Effort normal and breath sounds normal. No accessory muscle usage. No respiratory distress. She has no decreased breath sounds. She has no wheezes. She has no rhonchi. She has no rales.   Coarse breath sounds.   Lymphadenopathy:     She has no cervical adenopathy.   Neurological: She is alert and oriented to person, place, and time.   Skin: Skin is warm and dry.  No cyanosis. Nails show no clubbing.   Psychiatric: Mood and affect normal.   Nursing note and vitals reviewed.  CXR PA/lateral:  Questionable LLL infiltrate.  No effusions or pneumothorax.  No suspicious nodules or lesions. No fractures.   Will send for overread.        Assessment/Plan:  Pneumonia of left lower lobe due to infectious organism (H): CXR showed questionable LLL infiltrate.  Will treat with zithromax X5days, tessalon perles, and albuterol inh as needed for symptoms.  Recommend treatment with rest, fluids and chicken soup. Tylenol/ibuprofen prn fever/pain.  Recheck in clinic if symptoms worsen or if symptoms do not improve.  To the ER if he develops hemoptysis, chest pain, fevers>102, worsening shortness of breath/wheezing.    -     XR Chest 2 Views  -     azithromycin (ZITHROMAX) 250 MG tablet; 2 tablets the first day, then 1 tablet daily for the next 4 days  -     albuterol (PROAIR HFA/PROVENTIL HFA/VENTOLIN HFA) 108 (90 BASE) MCG/ACT Inhaler; Inhale 2 puffs into the lungs every 4 hours as needed for shortness of breath / dyspnea or wheezing  -     benzonatate (TESSALON) 200 MG capsule; Take 1 capsule (200 mg) by mouth 3 times daily as needed for cough    Influenza-like illness:  This has been more than 48hours now.  She declined testing and tamiflu.      Acute maxillary sinusitis, recurrence not specified:  Will concurrently treat with zithromax above.  Recommend tylenol/ibuprofen prn pain/fever and zyrtec/pseudofed for sinus congestion.            Halle Treviño PA-C

## 2018-03-15 ENCOUNTER — ALLIED HEALTH/NURSE VISIT (OUTPATIENT)
Dept: SURGERY | Facility: CLINIC | Age: 33
End: 2018-03-15
Payer: COMMERCIAL

## 2018-03-15 ENCOUNTER — OFFICE VISIT (OUTPATIENT)
Dept: SURGERY | Facility: CLINIC | Age: 33
End: 2018-03-15
Payer: COMMERCIAL

## 2018-03-15 ENCOUNTER — PRE VISIT (OUTPATIENT)
Dept: SURGERY | Facility: CLINIC | Age: 33
End: 2018-03-15

## 2018-03-15 VITALS
BODY MASS INDEX: 45.99 KG/M2 | HEART RATE: 93 BPM | HEIGHT: 67 IN | WEIGHT: 293 LBS | DIASTOLIC BLOOD PRESSURE: 92 MMHG | TEMPERATURE: 98.8 F | OXYGEN SATURATION: 96 % | SYSTOLIC BLOOD PRESSURE: 146 MMHG

## 2018-03-15 DIAGNOSIS — R06.83 SNORING: Primary | ICD-10-CM

## 2018-03-15 DIAGNOSIS — Z72.0 TOBACCO ABUSE: ICD-10-CM

## 2018-03-15 DIAGNOSIS — E66.01 MORBID OBESITY (H): ICD-10-CM

## 2018-03-15 LAB
DEPRECATED CALCIDIOL+CALCIFEROL SERPL-MC: 19 UG/L (ref 20–75)
ERYTHROCYTE [DISTWIDTH] IN BLOOD BY AUTOMATED COUNT: 12.8 % (ref 10–15)
HBA1C MFR BLD: 6 % (ref 4.3–6)
HCT VFR BLD AUTO: 41.9 % (ref 35–47)
HGB BLD-MCNC: 13.9 G/DL (ref 11.7–15.7)
MCH RBC QN AUTO: 29.7 PG (ref 26.5–33)
MCHC RBC AUTO-ENTMCNC: 33.2 G/DL (ref 31.5–36.5)
MCV RBC AUTO: 90 FL (ref 78–100)
PLATELET # BLD AUTO: 359 10E9/L (ref 150–450)
PTH-INTACT SERPL-MCNC: 158 PG/ML (ref 18–80)
RBC # BLD AUTO: 4.68 10E12/L (ref 3.8–5.2)
WBC # BLD AUTO: 9.8 10E9/L (ref 4–11)

## 2018-03-15 RX ORDER — TOPIRAMATE 25 MG/1
TABLET, FILM COATED ORAL
Qty: 90 TABLET | Refills: 3 | Status: SHIPPED | OUTPATIENT
Start: 2018-03-15 | End: 2018-06-14
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
## 2018-03-15 NOTE — LETTER
March 20, 2018      TO: Abigail Lopez  [address]  [place] [address]         Dear Ms. Abigail Lopez,    We received and reviewed your test results done on 03/15/2018.  You may receive more than one letter if we receive the results on multiple days.  Please share all lab and test results with your primary care provider and keep a copy for your own records.        Your test results are normal except for the following addressed below:    Your Vitamin D is 15-30:    -If you are not taking any vitamin D supplements then begin 2,000 IU of Vitamin D3 daily.    -Please have your lab rechecked in 2 months and fax results to [phone].     Your parathormone (PTH) level is elevated, your calcium level is normal and your vitamin D level is low:   -Please take vitamin D as directed above for two months.    -Repeat the PTH level, calcium and vitamin D at the end of two months and fax results to  [phone].    If you have any questions, feel free contact us at the Call Center [phone].      Resulted Orders   CBC with platelets   Result Value Ref Range    WBC 9.8 4.0 - 11.0 10e9/L    RBC Count 4.68 3.8 - 5.2 10e12/L    Hemoglobin 13.9 11.7 - 15.7 g/dL    Hematocrit 41.9 35.0 - 47.0 %    MCV 90 78 - 100 fl    MCH 29.7 26.5 - 33.0 pg    MCHC 33.2 31.5 - 36.5 g/dL    RDW 12.8 10.0 - 15.0 %    Platelet Count 359 150 - 450 10e9/L   Parathyroid Hormone Intact   Result Value Ref Range    Parathyroid Hormone Intact 158 (H) 18 - 80 pg/mL   Vitamin D Deficiency   Result Value Ref Range    Vitamin D Deficiency screening 19 (L) 20 - 75 ug/L      Comment:      Season, race, dietary intake, and treatment affect the concentration of   25-hydroxy-Vitamin D. Values may decrease during winter months and increase   during summer months. Values 20-29 ug/L may indicate Vitamin D insufficiency   and values <20 ug/L may indicate Vitamin D deficiency.  Vitamin D determination is routinely performed by an immunoassay specific for    25 hydroxyvitamin D3.  If an individual is on vitamin D2 (ergocalciferol)   supplementation, please specify 25 OH vitamin D2 and D3 level determination by   LCMSMS test [id number].           Sincerely,      Nevaeh YEBOAH RN BSN

## 2018-03-15 NOTE — PATIENT INSTRUCTIONS
"GOALS:  Relating To Eating:  Eat slowly (20-30 minutes per meal), chewing foods well (25 chews per bite/applesauce consistency)  9\" Plate method (1/2 plate non-starchy vegetables/fruit, 1/4 plate lean protein, 1/4 plate whole grain starch - no more than 1/2 cup carb/meal)    Relating to beverages:  Reduce caffeine/carbonation/calorie containing beverages  Separate fluids from meals by 30 minutes before, during, and after eating    Relating to dietary supplements:  Start a multivitamin containing iron daily    Relating to activity:  Increase activity level.  Exercise 5 days/week for 30 minutes   - swimming    Relating to cravings:  Anytime you have a craving, find an activity 15 minutes to see if craving goes away      Randa Liriano, TERESA, LD  If you need to schedule or reschedule with a dietitian please call [phone].   "

## 2018-03-15 NOTE — PROGRESS NOTES
"New Bariatric Nutrition Consultation Note    Reason For Visit: Nutrition Assessment    Abigail Lopez is a 33 year old presenting today for new bariatric nutrition consult.   Pt is interested in laparoscopic sleeve gastrectomy with Dr. Qureshi expected surgery in August/September.  Patient is accompanied by self.  This is pt's first of 6 required nutrition visits prior to surgery. Pt referred by Nevaeh GOMEZ(3/15/18).     She is interested in having weight loss surgery for the following reasons:  Unable to loose weight on her own    Support System Reviewed With Patient 3/3/2018   Who do you have in your support network that can be available to help you for the first 2 weeks after surgery? my mother   Who can you count on for support throughout your weight loss surgery journey? my mom, my friends, my coworkers       ANTHROPOMETRICS:  Estimated body mass index is 60.49 kg/(m^2) as calculated from the following:    Height as of an earlier encounter on 3/15/18: 1.69 m (5' 6.53\").    Weight as of an earlier encounter on 3/15/18: 172.8 kg (380 lb 14.4 oz).    Required weight loss goal pre-op: 38 lbs from initial consult weight (goal weight 342 lbs or less before surgery)       3/3/2018   I have tried the following methods to lose weight Watching portions or calories, Exercise, Weight Watchers, Atkins type diet (low carb/high protein), Slimfast, OTC Medications       Weight Loss Questions Reviewed With Patient 3/3/2018   How long have you been overweight? Since early childhood       SUPPLEMENT INFORMATION:  multivitamin     NUTRITION HISTORY:  Recall Diet Questions Reviewed With Patient 3/3/2018   Describe what you typically consume for breakfast (typical or most recent): breakfast sandwich, cereal, bakery roll  Skip twice per week  Apple and cheese stick   Describe what you typically consume for lunch (typical or most recent): sandwich, yogurt, pickles   Describe what you typically consume for supper (typical or most " recent): hamburger gravy, mashed potatoes, corn, dinner roll   Describe what you typically consume as snacks (typical or most recent): saltines, left overs, cereal   How many ounces of water, or other low calorie drinks, do you drink daily (8 oz=1 glass)? 48 oz   How many ounces of caffeine (coffee, tea, pop) do you drink daily (8 oz=1 glass)? 16 oz diet pop   How many ounces of carbonated (pop, beer, sparkling water) drinks do you drinky daily (8 oz=1 glass)? 16 oz   How many ounces of juice, pop, sweet tea, sports drinks, protein drinks, other sweetened drinks, do you drink daily (8 oz=1 glass)? 8 oz occasionally Gatorade    How many ounces of milk do you drink daily (8 oz=1 glass) 16 oz   Please indicate the type of milk: 1%   How often do you drink alcohol? Monthly or less   If you do drink alcohol, how many drinks might you have in a day? (one drink = 5 oz. wine, 1 can/bottle of beer, 1 shot liquor) 1 or 2       Eating Habits 3/3/2018   Do you have any dietary restrictions? No   Do you currently binge eat (eat a large amount of food in a short time)? Yes   Are you an emotional eater? Yes   Do you get up to eat after falling asleep? No   What foods do you crave? sugar       ADDITIONAL INFORMATION:    Dining Out History Reviewed With Patient 3/3/2018   How often do you dine out? 1-2 times per week   Where do you dine out? (select all that apply) take out   What types of food do you order when you dine out? Chinese, Mexican, pizza       Physical Activity Reviewed With Patient 3/3/2018   How often do you exercise? 1 to 2 times per week   What is the duration of your exercise (in minutes)? 15 Minutes   What types of exercise do you do? walking, climbing stairs at work   What keeps you from being more active?  Pain, Shortness of breath, Lack of Time, Too tired       NUTRITION DIAGNOSIS:  Obesity r/t long history of self-monitoring deficit and excessive energy intake aeb BMI >30.    INTERVENTION:  Intervention  "Provided/Education Provided on post-op diet guidelines, vitamins/minerals essential post-operatively, GI anatomy of bariatric surgeries, ways to help prepare for post-op diet guidelines pre-operatively, portion/calorie-control, mindful eating and sources of protein.  Provided pt with list of goals RD contact information.      Questions Reviewed With Patient 3/3/2018   How ready are you to make changes regarding your weight? Number 1 = Not ready at all to make changes up to 10 = very ready. 10   How confident are you that you can change? 1 = Not confident that you will be successful making changes up to 10 = very confident. 10       Patient Understanding: good  Expected Compliance: good    GOALS:  Relating To Eating:  Eat slowly (20-30 minutes per meal), chewing foods well (25 chews per bite/applesauce consistency)  9\" Plate method (1/2 plate non-starchy vegetables/fruit, 1/4 plate lean protein, 1/4 plate whole grain starch - no more than 1/2 cup carb/meal)  Avoid snacking    Relating to beverages:  Reduce caffeine/carbonation/calorie containing beverages  Separate fluids from meals by 30 minutes before, during, and after eating    Relating to dietary supplements:  Start a multivitamin containing iron daily    Relating to activity:  Increase activity level.  Exercise 5 days/week for 30 minutes   - swimming    Relating to cravings:  Anytime you have a craving, find an activity 15 minutes to see if craving goes away      Time spent with patient: 45 minutes.  Randa Liriano, RD, LD    "

## 2018-03-15 NOTE — PATIENT INSTRUCTIONS
"See dietitian monthly for 6 months (patient planning to see RD closer to home)  Sleep referral entered to eval for sleep apnea based on symptoms  Labs today first floor  Start topiramate ramp to 75 mg  See Nevaeh in 2-3 months for PBS visit and RD same day  Bariatric Task List  Status:  Is patient a candidate for bariatric surgery?:  Yes -     Cleared to schedule surgeon consult?:    -     Status:  surgery evaluation in process -     Surgeon: Dr Qureshi -     Tentative surgery month/year: August/Sept 2018 -        Insurance: Insurance:  [employer] -        Patient Info: Initial Weight:  380 lb 14.4 oz -     Date of Initial Weight/Height:  3/15/2018 -     Goal Weight (lbs):  38 -     Required Weight Loss:  342 -     Surgery Type:  sleeve gastrectomy -        Dietician Visits: Structured weight loss required by insurance?:  Yes -     Dietician Visit 1:  Completed -     Dietician Visit 2:  Needed -     Dietician Visit 3:  Needed -     Dietician Visit 4:  Needed -     Dietician Visit 5:  Needed -     Dietician Visit 6:  Needed -        Psychological Evaluation: Psych eval:  Needed -        Lab Work: Complete Blood Count:  Needed -     Comprehensive Metabolic Panel:  Needed -     Vitamin D:  Needed -     Hgb A1c:  Needed -     PTH:  Needed -     Nicotine Testing:  Needed -        Consults/ Clearance: Sleep Medicine:  Needed -     Medical Weight Management: Needed -        PCP: Establish care with PCP:  Completed -     PCP letter of support:  Needed -        Smoking: Quit tobacco use (3 months smoke free)?:  Needed -        Patient Education:  Information Session:  Completed -     Given \"Making your decision\" handout?:  Yes -     Given support group information?:  Yes -     Support plan in place?:  Completed -     Research consents signed?:  Yes -        Final Tasks:  Before surgery online class:  Needed -     Before surgery online class website link:  [email or web address]   After surgery online class:  " Needed -     After surgery online class website link:  [email or web address]   Nurse visit for weigh-in and information:  Needed -     Pre-assessment clinic visit with anesthesia team for H&P:  Needed -     Final labs (Hgb, plt, T&S, UA):  Needed -        Notes:   -       MEDICATION STARTED AT THIS APPOINTMENT  We are starting topiramate at bedtime.  Start one tab, 25 mg, for a week. Go up to 50 mg (2 tabs) for the next week. At the third week, take   3 tabs (75 mg).  Stay at 3 tabs until you are seen again. Call the nurse at [phone] if you have any questions or concerns. (Do not stop taking it if you don't think it's working. For some people it works even though they do not feel much different.)    Topiramate (Topamax) is a medication that is used most often to treat migraine headaches or for seizures. It has also been found to help with weight loss. Although it's not currently FDA approved for weight loss, it has been used safely for a number of years to help people who are carrying extra weight.     Just how topiramate helps with weight loss has not been exactly determined. However it seems to work on areas of the brain to quiet down signals related to eating.      Topiramate may make you:    >feel less interest in eating in between meals   >think less about food and eating   >find it easier to push the plate away   >find giving up pop easier    >have an easier time eating less    For some of our patients, the pills work right away. They feel and think quite differently about food. Other patients don't feel much of a change but find in fact they have lost weight! Like all weight loss medications, topiramate works best when you help it work.  This means:    1) Have less tempting high calorie (fattening) food around the house or office    2) Have lower calorie food (fruits, vegetables,low fat meats and dairy) for snacks    3) Eat out only one time or less each week.   4) Eat your meals at a  table with the TV or computer off.    Side-effects. Topiramate is generally well tolerated. The main side-effects we see are:   Tingling in hands,feet, or face (usually not very troublesome)   Mental confusion and word finding trouble (about 10% of patients have this.)     Feeling sleepy or a bit dopey- this goes away very soon after starting.    One of the dangers of topiramate is the possibility of birth defects--if you get pregnant when you are on it, there is the risk that your baby will be born with a cleft lip or palate.  If you are on topiramate and of child bearing age, you need to be on a reliable form of birth control or refrain from sexual intercourse.     Please refer to the pharmacy insert for more information on side-effects. Since many pharmacists are not familiar with the use of topiramate in weight loss, calling the clinic will get you the most accurate information on the use of this medication for weight loss.     In order to get refills of this or any medication we prescribe you must be seen in the medical weight mgmt clinic every 2-3 months. Please have your pharmacy fax a refill request to [phone].
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
## 2018-03-15 NOTE — MR AVS SNAPSHOT
After Visit Summary   3/15/2018    Abigail Lopez    MRN: [id number]           Patient Information     Date Of Birth          1985        Visit Information        Provider Department      3/15/2018 11:00 AM Nevaeh Castro PA-C [facility]        Today's Diagnoses     Snoring    -  1    Morbid obesity (H)        Tobacco abuse          Care Instructions    See dietitian monthly for 6 months (patient planning to see RD closer to home)  Sleep referral entered to eval for sleep apnea based on symptoms  Labs today first floor  Start topiramate ramp to 75 mg  See Nevaeh in 2-3 months for PBS visit and RD same day  Bariatric Task List  Status:  Is patient a candidate for bariatric surgery?:  Yes -     Cleared to schedule surgeon consult?:    -     Status:  surgery evaluation in process -     Surgeon: Dr Qureshi -     Tentative surgery month/year: Sept 2018 -        Insurance: Insurance:  [employer] -        Patient Info: Initial Weight:  380 lb 14.4 oz -     Date of Initial Weight/Height:  3/15/2018 -     Goal Weight (lbs):  38 -     Required Weight Loss:  342 -     Surgery Type:  sleeve gastrectomy -        Dietician Visits: Structured weight loss required by insurance?:  Yes -     Dietician Visit 1:  Completed -     Dietician Visit 2:  Needed -     Dietician Visit 3:  Needed -     Dietician Visit 4:  Needed -     Dietician Visit 5:  Needed -     Dietician Visit 6:  Needed -        Psychological Evaluation: Psych eval:  Needed -        Lab Work: Complete Blood Count:  Needed -     Comprehensive Metabolic Panel:  Needed -     Vitamin D:  Needed -     Hgb A1c:  Needed -     PTH:  Needed -     Nicotine Testing:  Needed -        Consults/ Clearance: Sleep Medicine:  Needed -     Medical Weight Management: Needed -        PCP: Establish care with PCP:  Completed -     PCP letter of support:  Needed -        Smoking: Quit tobacco use (3 months smoke free)?:  Needed -       "  Patient Education:  Information Session:  Completed -     Given \"Making your decision\" handout?:  Yes -     Given support group information?:  Yes -     Support plan in place?:  Completed -     Research consents signed?:  Yes -        Final Tasks:  Before surgery online class:  Needed -     Before surgery online class website link:  [email or web address]   After surgery online class:  Needed -     After surgery online class website link:  [email or web address]   Nurse visit for weigh-in and information:  Needed -     Pre-assessment clinic visit with anesthesia team for H&P:  Needed -     Final labs (Hgb, plt, T&S, UA):  Needed -        Notes:   -       MEDICATION STARTED AT THIS APPOINTMENT  We are starting topiramate at bedtime.  Start one tab, 25 mg, for a week. Go up to 50 mg (2 tabs) for the next week. At the third week, take   3 tabs (75 mg).  Stay at 3 tabs until you are seen again. Call the nurse at [phone] if you have any questions or concerns. (Do not stop taking it if you don't think it's working. For some people it works even though they do not feel much different.)    Topiramate (Topamax) is a medication that is used most often to treat migraine headaches or for seizures. It has also been found to help with weight loss. Although it's not currently FDA approved for weight loss, it has been used safely for a number of years to help people who are carrying extra weight.     Just how topiramate helps with weight loss has not been exactly determined. However it seems to work on areas of the brain to quiet down signals related to eating.      Topiramate may make you:    >feel less interest in eating in between meals   >think less about food and eating   >find it easier to push the plate away   >find giving up pop easier    >have an easier time eating less    For some of our patients, the pills work right away. They feel and think quite differently about food. Other " patients don't feel much of a change but find in fact they have lost weight! Like all weight loss medications, topiramate works best when you help it work.  This means:    1) Have less tempting high calorie (fattening) food around the house or office    2) Have lower calorie food (fruits, vegetables,low fat meats and dairy) for snacks    3) Eat out only one time or less each week.   4) Eat your meals at a table with the TV or computer off.    Side-effects. Topiramate is generally well tolerated. The main side-effects we see are:   Tingling in hands,feet, or face (usually not very troublesome)   Mental confusion and word finding trouble (about 10% of patients have this.)     Feeling sleepy or a bit dopey- this goes away very soon after starting.    One of the dangers of topiramate is the possibility of birth defects--if you get pregnant when you are on it, there is the risk that your baby will be born with a cleft lip or palate.  If you are on topiramate and of child bearing age, you need to be on a reliable form of birth control or refrain from sexual intercourse.     Please refer to the pharmacy insert for more information on side-effects. Since many pharmacists are not familiar with the use of topiramate in weight loss, calling the clinic will get you the most accurate information on the use of this medication for weight loss.     In order to get refills of this or any medication we prescribe you must be seen in the medical weight mgmt clinic every 2-3 months. Please have your pharmacy fax a refill request to [phone].                  Follow-ups after your visit        Additional Services     SLEEP EVALUATION & MANAGEMENT REFERRAL - ADULT -[facility]   Pablito Le - [phone]       Please be aware that coverage of these services is subject to the terms and limitations of your health insurance plan.  Call member services at your health plan with any benefit or coverage questions.       Please bring the following to your appointment:    >>   List of current medications   >>   This referral request   >>   Any documents/labs given to you for this referral                      Your next 10 appointments already scheduled     Mar 15, 2018  1:00 PM CDT   (Arrive by 12:45 PM)   NUTRITION VISIT with Randa Liriano RD   [facility] ([facility])    [address]  [address] [facility] [address] [phone]              Future tests that were ordered for you today     Open Future Orders        Priority Expected Expires Ordered    Nicotine and Cotinine Urine Routine  6/13/2018 3/15/2018    Hemoglobin A1c Routine  6/13/2018 3/15/2018    SLEEP EVALUATION & MANAGEMENT REFERRAL - ADULT -[facility]   P[place]ito Le - [phone] Routine  3/15/2019 3/15/2018            Who to contact     Please call your clinic at [phone] to:    Ask questions about your health    Make or cancel appointments    Discuss your medicines    Learn about your test results    Speak to your doctor            Additional Information About Your Visit        [employer] Information     [employer] gives you secure access to your electronic health record. If you see a primary care provider, you can also send messages to your care team and make appointments. If you have questions, please call your primary care clinic.  If you do not have a primary care provider, please call [phone] and they will assist you.      [employer] is an electronic gateway that provides easy, online access to your medical records. With [employer]iders, you can request a clinic appointment, read your test results, renew a prescription or communicate with your care team.     To access your existing account, please contact your [facility] or call [phone] for assistance.        Care EveryWhere ID     This is your Care EveryWhere ID. This could be used by other  "organizations to access your [place] medical records  [id number]        Your Vitals Were     Pulse Temperature Height Pulse Oximetry BMI (Body Mass Index)       93 98.8  F (37.1  C) (Oral) 5' 6.53\" 96% 60.49 kg/m2        Blood Pressure from Last 3 Encounters:   03/15/18 (!) 146/92   03/07/18 (!) 148/97   02/28/18 132/86    Weight from Last 3 Encounters:   03/15/18 (!) 380 lb 14.4 oz   03/07/18 (!) 385 lb   02/28/18 (!) 384 lb              We Performed the Following     CBC with platelets     Comprehensive metabolic panel     Parathyroid Hormone Intact     Vitamin D Deficiency          Today's Medication Changes          These changes are accurate as of 3/15/18 11:26 AM.  If you have any questions, ask your nurse or doctor.               Start taking these medicines.        Dose/Directions    topiramate 25 MG tablet   Commonly known as:  TOPAMAX   Used for:  Morbid obesity (H)   Started by:  Nevaeh Castro PA-C        25mg at bedtime for week 1, 50mg at bedtime for 1 week, and 75mg at bedtime thereafter   Quantity:  90 tablet   Refills:  3         Stop taking these medicines if you haven't already. Please contact your care team if you have questions.     azithromycin 250 MG tablet   Commonly known as:  ZITHROMAX   Stopped by:  Nevaeh Castro PA-C                Where to get your medicines      These medications were sent to [facility] [address], [facility] [address]  [address] [address]     Phone:  [phone]     topiramate 25 MG tablet                Primary Care Provider Office Phone # Fax #    Seble Anthony PA-C [phone] [address] [address] [address]        Equal Access to Services     [facility] MICHAEL : Praveena Cline, waaxda luqadaha, qaybta kaalmada adeegyada, esthela root. So [facility] [phone].    ATENCIÓN: Si marcus ely, tiene a stone disposición " servicios gratuitos de asistencia lingüística. Natasha noonan [phone].    We comply with applicable federal civil rights laws and [place] laws. We do not discriminate on the basis of race, color, national origin, age, disability, sex, sexual orientation, or gender identity.            Thank you!     Thank you for choosing [facility]  for your care. Our goal is always to provide you with excellent care. Hearing back from our patients is one way we can continue to improve our services. Please take a few minutes to complete the written survey that you may receive in the mail after your visit with us. Thank you!             Your Updated Medication List - Protect others around you: Learn how to safely use, store and throw away your medicines at www.disposemymeds.org.          This list is accurate as of 3/15/18 11:26 AM.  Always use your most recent med list.                   Brand Name Dispense Instructions for use Diagnosis    albuterol 108 (90 BASE) MCG/ACT Inhaler    PROAIR HFA/PROVENTIL HFA/VENTOLIN HFA    1 Inhaler    Inhale 2 puffs into the lungs every 4 hours as needed for shortness of breath / dyspnea or wheezing    Pneumonia of left lower lobe due to infectious organism (H)       benzonatate 200 MG capsule    TESSALON    21 capsule    Take 1 capsule (200 mg) by mouth 3 times daily as needed for cough    Pneumonia of left lower lobe due to infectious organism (H)       ketotifen 0.025 % Soln ophthalmic solution    ZADITOR    1 Bottle    Place 1 drop into both eyes every 12 hours    Allergic conjunctivitis, bilateral       lisinopril 20 MG tablet    PRINIVIL/ZESTRIL    90 tablet    Take 1 tablet (20 mg) by mouth daily Note increase in dose    Essential hypertension with goal blood pressure less than 140/90       topiramate 25 MG tablet    TOPAMAX    90 tablet    25mg at bedtime for week 1, 50mg at bedtime for 1 week, and 75mg at bedtime thereafter    Morbid obesity (H)

## 2018-03-15 NOTE — MR AVS SNAPSHOT
"                  MRN:[id number]                      After Visit Summary   3/15/2018    Abigail Lopez    MRN: [id number]           Visit Information        Provider Department      3/15/2018 1:00 PM Randa Liriano RD [facility]        Care Instructions    GOALS:  Relating To Eating:  Eat slowly (20-30 minutes per meal), chewing foods well (25 chews per bite/applesauce consistency)  9\" Plate method (1/2 plate non-starchy vegetables/fruit, 1/4 plate lean protein, 1/4 plate whole grain starch - no more than 1/2 cup carb/meal)    Relating to beverages:  Reduce caffeine/carbonation/calorie containing beverages  Separate fluids from meals by 30 minutes before, during, and after eating    Relating to dietary supplements:  Start a multivitamin containing iron daily    Relating to activity:  Increase activity level.  Exercise 5 days/week for 30 minutes   - swimming    Relating to cravings:  Anytime you have a craving, find an activity 15 minutes to see if craving goes away      Randa Liriano RD, LD  If you need to schedule or reschedule with a dietitian please call [phone].          [employer] Information     [employer] gives you secure access to your electronic health record. If you see a primary care provider, you can also send messages to your care team and make appointments. If you have questions, please call your primary care clinic.  If you do not have a primary care provider, please call [phone] and they will assist you.      [employer] is an electronic gateway that provides easy, online access to your medical records. With [employer], you can request a clinic appointment, read your test results, renew a prescription or communicate with your care team.     To access your existing account, please contact your [facility] or call [phone] for assistance.        Care EveryWhere ID     This is your Care EveryWhere ID. This could be used by other " organizations to access your [place] medical records  [id number]        Equal Access to Services     PAULINE RODRIGUEZ : Praveena Cline, rupert garcia, esthela antoine. [facility] [phone].    ATENCIÓN: Si habla español, tiene a stone disposición servicios gratuitos de asistencia lingüística. Llame al [phone].    We comply with applicable federal civil rights laws and [place] laws. We do not discriminate on the basis of race, color, national origin, age, disability, sex, sexual orientation, or gender identity.

## 2018-03-15 NOTE — LETTER
March 16, 2018      TO: Abigail Lopez  [address]  [place] [address]         Dear Ms. Abigail Lopez,    We received and reviewed your test results done on 03/15/2018.  You may receive more than one letter if we receive the results on multiple days.  Please share all lab and test results with your primary care provider and keep a copy for your own records.        Your test results are normal.    If you have any questions, feel free contact us at the Call Center [phone].      Resulted Orders   Hemoglobin A1c   Result Value Ref Range    Hemoglobin A1C 6.0 4.3 - 6.0 %       Sincerely,    Mukund YEBOAH RN BSN

## 2018-03-15 NOTE — NURSING NOTE
"(   Chief Complaint   Patient presents with     Consult     NBS, BMI 60.0     )    ( Weight: (!) 380 lb 14.4 oz )  ( Height: 5' 6.53\" )  ( BMI (Calculated): 60.62 )  ( Initial Weight: 380 lb 14.4 oz )  ( Cumulative weight loss (lbs): 0 )  (   )  (   )  ( Waist Circumference (cm): 163 cm )  (   )    ( BP: (!) 146/92 )  (   )  ( Temp: 98.8  F (37.1  C) )  ( Temp src: Oral )  ( Pulse: 93 )  (   )  ( SpO2: 96 % )    (   Patient Active Problem List   Diagnosis     BMI 50.0-59.9, adult (H)     Tobacco use disorder     Allergic rhinitis     Irregular menses     Dysmenorrhea     Menorrhagia     Health Care Home (Not Active)      CARDIOVASCULAR SCREENING; LDL GOAL LESS THAN 160     Morbid obesity (H)     Depression with anxiety     Hypertension goal BP (blood pressure) < 140/90     Morbid obesity due to excess calories (H)     Cervical high risk HPV (human papillomavirus) test positive    )  (   Current Outpatient Prescriptions   Medication Sig Dispense Refill     albuterol (PROAIR HFA/PROVENTIL HFA/VENTOLIN HFA) 108 (90 BASE) MCG/ACT Inhaler Inhale 2 puffs into the lungs every 4 hours as needed for shortness of breath / dyspnea or wheezing 1 Inhaler 0     lisinopril (PRINIVIL/ZESTRIL) 20 MG tablet Take 1 tablet (20 mg) by mouth daily Note increase in dose 90 tablet 1     benzonatate (TESSALON) 200 MG capsule Take 1 capsule (200 mg) by mouth 3 times daily as needed for cough (Patient not taking: Reported on 3/15/2018) 21 capsule 0     ketotifen (ZADITOR) 0.025 % SOLN Place 1 drop into both eyes every 12 hours (Patient not taking: Reported on 2/28/2018) 1 Bottle 11    )  ( Diabetes Eval:    )    ( Pain Eval:  Data Unavailable )    ( Wound Eval:       )    (   History   Smoking Status     Current Every Day Smoker     Packs/day: 0.25     Start date: 1/1/1998     Last attempt to quit: 1/31/2018   Smokeless Tobacco     Never Used     Comment: If considered for surgery I will gladly give up smoking to change my life "    )    ( Signed By:  Elvia Abel; March 15, 2018; 11:00 AM )

## 2018-03-24 ENCOUNTER — MYC MEDICAL ADVICE (OUTPATIENT)
Dept: SURGERY | Facility: CLINIC | Age: 33
End: 2018-03-24

## 2018-03-24 DIAGNOSIS — E55.9 VITAMIN D DEFICIENCY: Primary | ICD-10-CM

## 2018-04-16 ENCOUNTER — OFFICE VISIT (OUTPATIENT)
Dept: FAMILY MEDICINE | Facility: CLINIC | Age: 33
End: 2018-04-16
Payer: COMMERCIAL

## 2018-04-16 VITALS
WEIGHT: 293 LBS | BODY MASS INDEX: 45.99 KG/M2 | DIASTOLIC BLOOD PRESSURE: 88 MMHG | SYSTOLIC BLOOD PRESSURE: 126 MMHG | HEIGHT: 67 IN | RESPIRATION RATE: 20 BRPM | OXYGEN SATURATION: 99 % | HEART RATE: 109 BPM | TEMPERATURE: 97.8 F

## 2018-04-16 DIAGNOSIS — M22.2X2 PATELLOFEMORAL PAIN SYNDROME OF LEFT KNEE: Primary | ICD-10-CM

## 2018-04-16 PROCEDURE — 99214 OFFICE O/P EST MOD 30 MIN: CPT | Performed by: PHYSICIAN ASSISTANT

## 2018-04-16 NOTE — PATIENT INSTRUCTIONS
Take ibuprofen with food 3-4 every 8-12 hours for 3 days, then take as needed after that.     Use sleeve when active, avoid squatting  Send me a mychart  If symptoms worsen or are not improving over the next week      Understanding Patellofemoral Syndrome    Patellofemoral syndrome is a condition that causes pain on the front of the knee. The large bones of the upper and lower leg meet at the knee. This joint also includes a small triangle-shaped bone that rests on top of the leg bones. This is the kneecap (patella). Patellofemoral refers to the patella and the thigh bone (femur). These bones are surrounded by connective tissue and muscles. Patellofemoral pain is believed to come from stress on the tissues of and around the knee.  What causes patellofemoral syndrome?  No single cause for patellofemoral pain has been found. But many things are likely to contribute to this type of knee pain. These include:    Actions that put repeated stress on the knee, such as running and squatting    Overtraining at a sport    Weak hip or thigh muscle    Normal variations in the way body parts fit together    Poor form during activities that stress the knee, such as running    A fall or blow to the knee  Symptoms of patellofemoral syndrome  Pain is a common symptom. It s often on the front of the knee, but can be around the kneecap. Pain can occur at certain times, such as when you are:    Running    Sitting for a long time with your knees bent, such as at a movie    Walking up or down stairs    Squatting  Other symptoms may include:    A feeling of the knee catching or locking    A grinding or crackling noise in your knee  Treatment for patellofemoral syndrome  Treatment focuses on reducing pain and avoiding further injury. Treatments may include:    Rest your leg. This gives your knee time to recover. You may need to avoid or change the activity that caused the problem, such as not running for a while.    Prescription or  over-the-counter pain medicines. These help reduce inflammation, swelling, and pain.    Cold packs. These help reduce pain.    Stretches and other exercises. These can improve balance, flexibility, and strength.    A shoe insert (orthotic). This can make your knee more stable.    Elastic tape or a brace. These can make your knee more stable.    Physical therapy. This may include exercises or other treatments.    Surgery. In rare cases, if other treatments don t relieve symptoms, you may need surgery.  Possible complications of patellofemoral syndrome  If you don t give your knee time to heal, symptoms may return or get worse. Follow your healthcare provider s instructions on resting and treating your knee.  When to call your healthcare provider  Call your healthcare provider right away if you have any of these:    Fever of 100.4 F (38 C) or higher, or as directed    Pain that gets worse    Symptoms that don t get better, or get worse    New symptoms   Date Last Reviewed: 3/10/2016    8206-5815 [employer]. [address] [place] [address], [place] [address]. All rights reserved. This information is not intended as a substitute for professional medical care. Always follow your healthcare professional's instructions.        Understanding Patellofemoral Syndrome    Patellofemoral syndrome is a condition that causes pain on the front of the knee. The large bones of the upper and lower leg meet at the knee. This joint also includes a small triangle-shaped bone that rests on top of the leg bones. This is the kneecap (patella). Patellofemoral refers to the patella and the thigh bone (femur). These bones are surrounded by connective tissue and muscles. Patellofemoral pain is believed to come from stress on the tissues of and around the knee.  What causes patellofemoral syndrome?  No single cause for patellofemoral pain has been found. But many things are likely to contribute to this type of knee pain. These  include:    Actions that put repeated stress on the knee, such as running and squatting    Overtraining at a sport    Weak hip or thigh muscle    Normal variations in the way body parts fit together    Poor form during activities that stress the knee, such as running    A fall or blow to the knee  Symptoms of patellofemoral syndrome  Pain is a common symptom. It s often on the front of the knee, but can be around the kneecap. Pain can occur at certain times, such as when you are:    Running    Sitting for a long time with your knees bent, such as at a movie    Walking up or down stairs    Squatting  Other symptoms may include:    A feeling of the knee catching or locking    A grinding or crackling noise in your knee  Treatment for patellofemoral syndrome  Treatment focuses on reducing pain and avoiding further injury. Treatments may include:    Rest your leg. This gives your knee time to recover. You may need to avoid or change the activity that caused the problem, such as not running for a while.    Prescription or over-the-counter pain medicines. These help reduce inflammation, swelling, and pain.    Cold packs. These help reduce pain.    Stretches and other exercises. These can improve balance, flexibility, and strength.    A shoe insert (orthotic). This can make your knee more stable.    Elastic tape or a brace. These can make your knee more stable.    Physical therapy. This may include exercises or other treatments.    Surgery. In rare cases, if other treatments don t relieve symptoms, you may need surgery.  Possible complications of patellofemoral syndrome  If you don t give your knee time to heal, symptoms may return or get worse. Follow your healthcare provider s instructions on resting and treating your knee.  When to call your healthcare provider  Call your healthcare provider right away if you have any of these:    Fever of 100.4 F (38 C) or higher, or as directed    Pain that gets worse    Symptoms that  don t get better, or get worse    New symptoms   Date Last Reviewed: 3/10/2016    8499-7498 [employer]. [address] [facility], [place] [address]. All rights reserved. This information is not intended as a substitute for professional medical care. Always follow your healthcare professional's instructions.

## 2018-04-16 NOTE — MR AVS SNAPSHOT
After Visit Summary   4/16/2018    Abigail Lopez    MRN: [id number]           Patient Information     Date Of Birth          1985        Visit Information        Provider Department      4/16/2018 12:00 PM Seble Anthony PA-C [facility]        Today's Diagnoses     Patellofemoral pain syndrome of left knee    -  1      Care Instructions      Take ibuprofen with food 3-4 every 8-12 hours for 3 days, then take as needed after that.     Use sleeve when active, avoid squatting  Send me a mychart  If symptoms worsen or are not improving over the next week      Understanding Patellofemoral Syndrome    Patellofemoral syndrome is a condition that causes pain on the front of the knee. The large bones of the upper and lower leg meet at the knee. This joint also includes a small triangle-shaped bone that rests on top of the leg bones. This is the kneecap (patella). Patellofemoral refers to the patella and the thigh bone (femur). These bones are surrounded by connective tissue and muscles. Patellofemoral pain is believed to come from stress on the tissues of and around the knee.  What causes patellofemoral syndrome?  No single cause for patellofemoral pain has been found. But many things are likely to contribute to this type of knee pain. These include:    Actions that put repeated stress on the knee, such as running and squatting    Overtraining at a sport    Weak hip or thigh muscle    Normal variations in the way body parts fit together    Poor form during activities that stress the knee, such as running    A fall or blow to the knee  Symptoms of patellofemoral syndrome  Pain is a common symptom. It s often on the front of the knee, but can be around the kneecap. Pain can occur at certain times, such as when you are:    Running    Sitting for a long time with your knees bent, such as at a movie    Walking up or down stairs    Squatting  Other symptoms may include:    A feeling of  the knee catching or locking    A grinding or crackling noise in your knee  Treatment for patellofemoral syndrome  Treatment focuses on reducing pain and avoiding further injury. Treatments may include:    Rest your leg. This gives your knee time to recover. You may need to avoid or change the activity that caused the problem, such as not running for a while.    Prescription or over-the-counter pain medicines. These help reduce inflammation, swelling, and pain.    Cold packs. These help reduce pain.    Stretches and other exercises. These can improve balance, flexibility, and strength.    A shoe insert (orthotic). This can make your knee more stable.    Elastic tape or a brace. These can make your knee more stable.    Physical therapy. This may include exercises or other treatments.    Surgery. In rare cases, if other treatments don t relieve symptoms, you may need surgery.  Possible complications of patellofemoral syndrome  If you don t give your knee time to heal, symptoms may return or get worse. Follow your healthcare provider s instructions on resting and treating your knee.  When to call your healthcare provider  Call your healthcare provider right away if you have any of these:    Fever of 100.4 F (38 C) or higher, or as directed    Pain that gets worse    Symptoms that don t get better, or get worse    New symptoms   Date Last Reviewed: 3/10/2016    3475-3366 [employer]. [address] [place] [address]. All rights reserved. This information is not intended as a substitute for professional medical care. Always follow your healthcare professional's instructions.        Understanding Patellofemoral Syndrome    Patellofemoral syndrome is a condition that causes pain on the front of the knee. The large bones of the upper and lower leg meet at the knee. This joint also includes a small triangle-shaped bone that rests on top of the leg bones. This is the kneecap (patella). Patellofemoral  refers to the patella and the thigh bone (femur). These bones are surrounded by connective tissue and muscles. Patellofemoral pain is believed to come from stress on the tissues of and around the knee.  What causes patellofemoral syndrome?  No single cause for patellofemoral pain has been found. But many things are likely to contribute to this type of knee pain. These include:    Actions that put repeated stress on the knee, such as running and squatting    Overtraining at a sport    Weak hip or thigh muscle    Normal variations in the way body parts fit together    Poor form during activities that stress the knee, such as running    A fall or blow to the knee  Symptoms of patellofemoral syndrome  Pain is a common symptom. It s often on the front of the knee, but can be around the kneecap. Pain can occur at certain times, such as when you are:    Running    Sitting for a long time with your knees bent, such as at a movie    Walking up or down stairs    Squatting  Other symptoms may include:    A feeling of the knee catching or locking    A grinding or crackling noise in your knee  Treatment for patellofemoral syndrome  Treatment focuses on reducing pain and avoiding further injury. Treatments may include:    Rest your leg. This gives your knee time to recover. You may need to avoid or change the activity that caused the problem, such as not running for a while.    Prescription or over-the-counter pain medicines. These help reduce inflammation, swelling, and pain.    Cold packs. These help reduce pain.    Stretches and other exercises. These can improve balance, flexibility, and strength.    A shoe insert (orthotic). This can make your knee more stable.    Elastic tape or a brace. These can make your knee more stable.    Physical therapy. This may include exercises or other treatments.    Surgery. In rare cases, if other treatments don t relieve symptoms, you may need surgery.  Possible complications of  patellofemoral syndrome  If you don t give your knee time to heal, symptoms may return or get worse. Follow your healthcare provider s instructions on resting and treating your knee.  When to call your healthcare provider  Call your healthcare provider right away if you have any of these:    Fever of 100.4 F (38 C) or higher, or as directed    Pain that gets worse    Symptoms that don t get better, or get worse    New symptoms   Date Last Reviewed: 3/10/2016    0380-0160 [employer]. [address] [place] [address]. All rights reserved. This information is not intended as a substitute for professional medical care. Always follow your healthcare professional's instructions.                Follow-ups after your visit        Your next 10 appointments already scheduled     Apr 23, 2018 11:00 AM CDT   (Arrive by 10:45 AM)   NUTRITION VISIT with TERESA Hoover [facility] ([facility])    [address] [place] [id number]   [phone]            Apr 23, 2018  1:00 PM CDT   New Sleep Patient with Eros Brizuela MD   [facility] ([facility])    [address] [place] [id number] [phone]            May 14, 2018 11:00 AM CDT   (Arrive by 10:45 AM)   NUTRITION VISIT with Alexa Avery RD   [facility] ([facility])    [address] [place] [address]   [phone]            Jun 14, 2018 11:00 AM CDT   (Arrive by 10:45 AM)   NUTRITION VISIT with Randa Liriano RD   [facility] ([facility])    [address] [place] [address]   [phone]            Jun 14, 2018 11:45 AM CDT   (Arrive by 11:30 AM)   RETURN BARIATRIC SURGERY with Nevaeh Castro PA-C   [facility]  "[facility] ([facility])    [address] [facility]  4th Floor  [facility] [address] [phone]              Who to contact     If you have questions or need follow up information about today's clinic visit or your schedule please contact [facility] directly at [phone].  Normal or non-critical lab and imaging results will be communicated to you by MyChart, letter or phone within 4 business days after the clinic has received the results. If you do not hear from us within 7 days, please contact the clinic through [employer] or phone. If you have a critical or abnormal lab result, we will notify you by phone as soon as possible.  Submit refill requests through [employer] or call your pharmacy and they will forward the refill request to us. Please allow 3 business days for your refill to be completed.          Additional Information About Your Visit        [employer] Information     [employer] gives you secure access to your electronic health record. If you see a primary care provider, you can also send messages to your care team and make appointments. If you have questions, please call your primary care clinic.  If you do not have a primary care provider, please call [phone] and they will assist you.        Care EveryWhere ID     This is your Care EveryWhere ID. This could be used by other organizations to access your [facility] medical records  [id number]        Your Vitals Were     Pulse Temperature Respirations Height Pulse Oximetry Breastfeeding?    109 97.8  F (36.6  C) (Oral) 20 5' 7\" (1.702 m) 99% No    BMI (Body Mass Index)                   59.2 kg/m2            Blood Pressure from Last 3 Encounters:   04/16/18 126/88   03/15/18 (!) 146/92   03/07/18 (!) 148/97    Weight from Last 3 Encounters:   04/16/18 (!) 378 lb (171.5 kg)   03/15/18 (!) 380 lb 14.4 oz (172.8 kg)   03/07/18 (!) 385 lb (174.6 kg)              Today, you had the following     No orders found " for display       Primary Care Provider Office Phone # Fax #    Seble Anthony PA-C [phone] [address] [facility] [address]        Equal Access to Services     PAULINE RODRIGUEZ : Haddavid nati castorena tajo Somellyali, waaxda luqadaha, qaybta kaalmada adeegyada, esthela joseph maliha root. So [facility] [phone].    ATENCIÓN: Si habla español, tiene a stone disposición servicios gratuitos de asistencia lingüística. Llame al [phone].    We comply with applicable federal civil rights laws and [place] laws. We do not discriminate on the basis of race, color, national origin, age, disability, sex, sexual orientation, or gender identity.            Thank you!     Thank you for choosing [facility]  for your care. Our goal is always to provide you with excellent care. Hearing back from our patients is one way we can continue to improve our services. Please take a few minutes to complete the written survey that you may receive in the mail after your visit with us. Thank you!             Your Updated Medication List - Protect others around you: Learn how to safely use, store and throw away your medicines at www.disposemymeds.org.          This list is accurate as of 4/16/18 12:20 PM.  Always use your most recent med list.                   Brand Name Dispense Instructions for use Diagnosis    albuterol 108 (90 Base) MCG/ACT Inhaler    PROAIR HFA/PROVENTIL HFA/VENTOLIN HFA    1 Inhaler    Inhale 2 puffs into the lungs every 4 hours as needed for shortness of breath / dyspnea or wheezing    Pneumonia of left lower lobe due to infectious organism (H)       cholecalciferol 1000 units capsule    vitamin  -D    60 capsule    Take 2 capsules (2,000 Units) by mouth daily    Vitamin D deficiency       ketotifen 0.025 % Soln ophthalmic solution    ZADITOR    1 Bottle    Place 1 drop into both eyes every 12 hours    Allergic conjunctivitis, bilateral       lisinopril 20 MG  tablet    PRINIVIL/ZESTRIL    90 tablet    Take 1 tablet (20 mg) by mouth daily Note increase in dose    Essential hypertension with goal blood pressure less than 140/90       topiramate 25 MG tablet    TOPAMAX    90 tablet    25mg at bedtime for week 1, 50mg at bedtime for 1 week, and 75mg at bedtime thereafter    Morbid obesity (H)

## 2018-04-16 NOTE — NURSING NOTE
"Chief Complaint   Patient presents with     Pain     new pain per pt x      Health Maintenance     Lipid       Initial BP (!) 160/96  Pulse 109  Temp 97.8  F (36.6  C) (Oral)  Resp 20  Ht 5' 7\" (1.702 m)  Wt (!) 378 lb (171.5 kg)  SpO2 99%  Breastfeeding? No  BMI 59.2 kg/m2 Estimated body mass index is 59.2 kg/(m^2) as calculated from the following:    Height as of this encounter: 5' 7\" (1.702 m).    Weight as of this encounter: 378 lb (171.5 kg).  Medication Reconciliation: complete      Florin Catherine MA    "

## 2018-04-16 NOTE — PROGRESS NOTES
SUBJECTIVE:                                                    Abigail Lopez is a 33 year old female who presents to clinic today for the following health issues:    Joint Pain    Onset: x 3-4 days    Description:   Location: left knee  Character: Dull ache and sharp    Intensity: mild    Progression of Symptoms: worse    Accompanying Signs & Symptoms:  Other symptoms: swelling    History:   Previous similar pain: no       Precipitating factors:   Trauma or overuse: YES- possible more usage to it this weekend    Alleviating factors:  Improved by: rest/inactivity, ice, stretching and acetaminophen    Therapies Tried and outcome: noted above    Has been packing (moving this summer)  and squatting (moving houses soon) and did a lot of shoveling this weekend in the snow.  Started off with mild pain now worse.  Hurts to turn and twist.  Can't bend at all.  Seems swollen to her but not sure.  Feels like it might give out.  Hurts when she does stairs.   Bending it hurts.    Is morbidly obese-meeting with bariatric surgeon to plan this.     Does not usually bend or squat much. Or shovel so this was extra stress on her .    No erythema.   Daily smoker.         Problem list and histories reviewed & adjusted, as indicated.  Additional history: as documented    Patient Active Problem List   Diagnosis     BMI 50.0-59.9, adult (H)     Tobacco use disorder     Allergic rhinitis     Irregular menses     Dysmenorrhea     Menorrhagia     Health Care Home (Not Active)      CARDIOVASCULAR SCREENING; LDL GOAL LESS THAN 160     Morbid obesity (H)     Depression with anxiety     Hypertension goal BP (blood pressure) < 140/90     Morbid obesity due to excess calories (H)     Cervical high risk HPV (human papillomavirus) test positive     Past Surgical History:   Procedure Laterality Date     BIOPSY  3/01/2018    colposcopy     NO HISTORY OF SURGERY         Social History   Substance Use Topics     Smoking status: Current Every Day  Smoker     Packs/day: 0.25     Start date: 1/1/1998     Last attempt to quit: 1/31/2018     Smokeless tobacco: Never Used      Comment: If considered for surgery I will gladly give up smoking to change my life     Alcohol use Yes      Comment: 2 years ago drank 2-3x/week. Now maybe a beer or two in a mo     Family History   Problem Relation Age of Onset     Breast Cancer Mother      Eye Disorder Mother      Depression Mother      depression and anxiety     Thyroid Disease Mother      CEREBROVASCULAR DISEASE Maternal Grandmother      CANCER Maternal Grandmother      HEART DISEASE Maternal Grandfather      Asthma Brother      Eye Disorder Brother      Depression Brother      Coronary Artery Disease Other      Depression Brother      Asthma Brother      Thyroid Disease Other      mom's brother     Obesity Other          Current Outpatient Prescriptions   Medication Sig Dispense Refill     topiramate (TOPAMAX) 25 MG tablet 25mg at bedtime for week 1, 50mg at bedtime for 1 week, and 75mg at bedtime thereafter 90 tablet 3     albuterol (PROAIR HFA/PROVENTIL HFA/VENTOLIN HFA) 108 (90 BASE) MCG/ACT Inhaler Inhale 2 puffs into the lungs every 4 hours as needed for shortness of breath / dyspnea or wheezing 1 Inhaler 0     lisinopril (PRINIVIL/ZESTRIL) 20 MG tablet Take 1 tablet (20 mg) by mouth daily Note increase in dose 90 tablet 1     cholecalciferol (VITAMIN  -D) 1000 UNITS capsule Take 2 capsules (2,000 Units) by mouth daily (Patient not taking: Reported on 4/16/2018) 60 capsule 11     ketotifen (ZADITOR) 0.025 % SOLN Place 1 drop into both eyes every 12 hours (Patient not taking: Reported on 4/16/2018) 1 Bottle 11     Allergies   Allergen Reactions     Polymyxin B Visual Disturbance     Eyes were burning and red       ROS:  Constitutional, HEENT, cardiovascular, pulmonary, GI, , musculoskeletal, neuro, skin, endocrine and psych systems are negative, except as otherwise noted.    OBJECTIVE:     /88  Pulse 109   "Temp 97.8  F (36.6  C) (Oral)  Resp 20  Ht 5' 7\" (1.702 m)  Wt (!) 378 lb (171.5 kg)  SpO2 99%  Breastfeeding? No  BMI 59.2 kg/m2  Body mass index is 59.2 kg/(m^2).  GENERAL: alert, no distress and obese  RESP: lungs clear to auscultation - no rales, rhonchi or wheezes  CV: regular rate and rhythm, normal S1 S2, no S3 or S4, no murmur, click or rub, no peripheral edema and peripheral pulses strong  SKIN: no suspicious lesions or rashes  PSYCH: mentation appears normal, affect normal/bright  Ortho: left Knee-No gross deformity.  No erythema.  No effusion.  No ecchymosis. No warmth.  Tender to palpation inferior to patella.   Range of motion intact fully.  Sensation intact distally.  Distal pulses strong. Hip, knee, and ankle strength 5/5 and equal bilaterally.  Anterior drawer sign negative. Valgus(MCL)/varus (LCL) testing negative for pain.  McMurrays negative.             ASSESSMENT/PLAN:     ASSESSMENT / PLAN:  (M22.2X2) Patellofemoral pain syndrome of left knee  (primary encounter diagnosis)  Comment: likely due to increased use over the weekend and patients weight, will start with conservative treatment and consider MRI/PT in future if needed.  She will  knee sleeve OTC we do not have one here that works for her.   Plan:     (Z68.43) BMI 50.0-59.9, adult (H)  Comment:   Plan:   Continue with bariatric specialist       Billin min spent face-to-face with patient. 15 min on history, 5 on exam, 5 on discussing diagnosis and treatment plan.       Seble Anthony PA-C  [facility]      "

## 2018-04-23 ENCOUNTER — ALLIED HEALTH/NURSE VISIT (OUTPATIENT)
Dept: SURGERY | Facility: CLINIC | Age: 33
End: 2018-04-23
Payer: COMMERCIAL

## 2018-04-23 ENCOUNTER — OFFICE VISIT (OUTPATIENT)
Dept: SLEEP MEDICINE | Facility: CLINIC | Age: 33
End: 2018-04-23
Payer: COMMERCIAL

## 2018-04-23 VITALS
BODY MASS INDEX: 45.99 KG/M2 | DIASTOLIC BLOOD PRESSURE: 82 MMHG | OXYGEN SATURATION: 98 % | HEIGHT: 67 IN | SYSTOLIC BLOOD PRESSURE: 129 MMHG | HEART RATE: 100 BPM | WEIGHT: 293 LBS

## 2018-04-23 DIAGNOSIS — R06.83 SNORING: Primary | ICD-10-CM

## 2018-04-23 DIAGNOSIS — J35.1 TONSILLAR ENLARGEMENT: ICD-10-CM

## 2018-04-23 DIAGNOSIS — E66.01 MORBID OBESITY (H): ICD-10-CM

## 2018-04-23 DIAGNOSIS — I10 HYPERTENSION GOAL BP (BLOOD PRESSURE) < 140/90: Chronic | ICD-10-CM

## 2018-04-23 PROCEDURE — 99244 OFF/OP CNSLTJ NEW/EST MOD 40: CPT | Performed by: INTERNAL MEDICINE

## 2018-04-23 RX ORDER — ZOLPIDEM TARTRATE 5 MG/1
TABLET ORAL
Qty: 1 TABLET | Refills: 0 | Status: SHIPPED | OUTPATIENT
Start: 2018-04-23 | End: 2018-06-27

## 2018-04-23 NOTE — PROGRESS NOTES
"Bariatric Nutrition Consultation Note    Reason For Visit: Nutrition Reassessment    Abigail Lopez is a 33 year old presenting today for new bariatric nutrition consult.   Pt is interested in laparoscopic sleeve gastrectomy with Dr. Qureshi expected surgery in August/September.  Patient is accompanied by self.  This is pt's second of 6 required nutrition visits prior to surgery. Pt referred by Nevaeh GOMEZ(3/15/18).     Coordination Note:   4/23/18: Updated task list with RD visits.  Pt plans to schedule psych eval today.  Pt has scheduled sleep consult this month (see task list for details).   *Pt needs to work on quitting smoking.    She is interested in having weight loss surgery for the following reasons:  Unable to loose weight on her own    ANTHROPOMETRICS:    Height on 3/15/18: 1.69 m (5' 6.53\").    Initial Weight on 3/15/18: 172.8 kg (380 lb 14.4 oz) with BMI of 60.49    Current Weight: 376.1 lbs     Required weight loss goal pre-op: 38 lbs from initial consult weight (goal weight 342 lbs or less before surgery)    SUPPLEMENT INFORMATION:  multivitamin     Pt is not yet on vitamin D.  Pt plans to  her prescriptions for this soon.     NUTRITION HISTORY:    Progress with Previous Goals:  Relating To Eating:  Eat slowly (20-30 minutes per meal), chewing foods well (25 chews per bite/applesauce consistency) - continues to work on this; challenging  9\" Plate method (1/2 plate non-starchy vegetables/fruit, 1/4 plate lean protein, 1/4 plate whole grain starch - no more than 1/2 cup carb/meal) - Pt has been working on this.  Avoid snacking - Working on this. Doesn't feel that topiramate is helping.    Relating to beverages:  Reduce caffeine/carbonation/calorie containing beverages - Greatly improved; down to 1 sugary/caffeine beverage on Fridays.  Plans to give it up completely now.   Separate fluids from meals by 30 minutes before, during, and after eating - Starting to work on this.     Relating to " "dietary supplements:  Start a multivitamin containing iron daily - meeting.     Relating to activity:  Increase activity level.  Exercise 5 days/week for 30 minutes  - Walking twice/day recently for a total of 50 min/day.   - swimming - Not consistently currently.     Relating to cravings:  Anytime you have a craving, find an activity 15 minutes to see if craving goes away. -Pt plans to work on this now.    Pt noted that she sometimes makes poor decisions for breakfast due to lack of time.  Pt asked if she could just have a Premier Protein shake (160 Taras, 30 g protein).      Eating Habits 3/3/2018   Do you have any dietary restrictions? No   Do you currently binge eat (eat a large amount of food in a short time)? Yes   Are you an emotional eater? Yes   Do you get up to eat after falling asleep? No   What foods do you crave? sugar       NUTRITION DIAGNOSIS:  Obesity r/t long history of self-monitoring deficit and excessive energy intake aeb BMI >30. - continues.    INTERVENTION:  Intervention Provided/Education Provided: Praised Pt on weight lost thus far.  Discussed preparing for post-op diet guidelines pre-operatively, portion/calorie-control, and mindful eating.  Provided Pt with list of goals RD contact information.      Patient Understanding: good  Expected Compliance: good    GOALS:  Relating To Eating:  Eat slowly (20-30 minutes per meal), chewing foods well (25 chews per bite/applesauce consistency)  9\" Plate method (1/2 plate non-starchy vegetables/fruit, 1/4 plate lean protein, 1/4 plate whole grain starch - no more than 1/2 cup carb/meal)  Avoid snacking  Have a protein shake for breakfast (160 Taras, 30 g protein Premier Protein is fine).     Relating to beverages:  Reduce caffeine/carbonation/calorie containing beverages  Separate fluids from meals by 30 minutes before, during, and after eating    Take vitamin D as prescribed along with Multivitamin daily.     Relating to activity:  Increase activity " level.  Exercise (walking) 5 days/week for 50 minutes.   - swimming    Relating to cravings:  Anytime you have a craving, find an activity 15 minutes to see if craving goes away      Time spent with patient: 30 minutes.  Alexa Avery MS, RDN, LDN, CLT  Pager: [phone]
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
## 2018-04-23 NOTE — NURSING NOTE
"Chief Complaint   Patient presents with     Sleep Problem     pre bariatric assesment       Initial /82  Pulse 100  Ht 1.702 m (5' 7\")  Wt (!) 170.6 kg (376 lb)  SpO2 98%  BMI 58.89 kg/m2 Estimated body mass index is 58.89 kg/(m^2) as calculated from the following:    Height as of this encounter: 1.702 m (5' 7\").    Weight as of this encounter: 170.6 kg (376 lb).  Medication Reconciliation: complete   Neck circumference: 17 inches.      "

## 2018-04-23 NOTE — PATIENT INSTRUCTIONS

## 2018-04-23 NOTE — PATIENT INSTRUCTIONS
"GOALS:  Relating To Eating:  Eat slowly (20-30 minutes per meal), chewing foods well (25 chews per bite/applesauce consistency)  9\" Plate method (1/2 plate non-starchy vegetables/fruit, 1/4 plate lean protein, 1/4 plate whole grain starch - no more than 1/2 cup carb/meal)  Avoid snacking  Have a protein shake for breakfast (160 Taras, 30 g protein Premier Protein is fine).     Relating to beverages:  Reduce caffeine/carbonation/calorie containing beverages  Separate fluids from meals by 30 minutes before, during, and after eating    Take vitamin D as prescribed along with Multivitamin daily.     Relating to activity:  Increase activity level.  Exercise (walking) 5 days/week for 50 minutes.   - swimming    Relating to cravings:  Anytime you have a craving, find an activity 15 minutes to see if craving goes away    "

## 2018-04-23 NOTE — MR AVS SNAPSHOT
"                  MRN:[id number]                      After Visit Summary   4/23/2018    Abigail Lopez    MRN: [id number]           Visit Information        Provider Department      4/23/2018 11:00 AM Alexa Avery RD [facility]        Your next 10 appointments already scheduled     Apr 23, 2018  1:00 PM CDT   New Sleep Patient with Eros Brizuela MD   [facility] ([facility])    [address] [place] [id number]   [phone]            May 14, 2018 11:00 AM CDT   (Arrive by 10:45 AM)   NUTRITION VISIT with TERESA [facility] ([facility])    [address] [place] [id number]   [phone]            Jun 14, 2018 11:00 AM CDT   (Arrive by 10:45 AM)   NUTRITION VISIT with TERESA [facility] ([facility])    [address] [place] [id number]   [phone]            Jun 14, 2018 11:45 AM CDT   (Arrive by 11:30 AM)   RETURN BARIATRIC SURGERY with MARIAJOSE Guzmán [facility] ([facility])    [address] [place] [id number]   [phone]              Care Instructions    GOALS:  Relating To Eating:  Eat slowly (20-30 minutes per meal), chewing foods well (25 chews per bite/applesauce consistency)  9\" Plate method (1/2 plate non-starchy vegetables/fruit, 1/4 plate lean protein, 1/4 plate whole grain starch - no more than 1/2 cup carb/meal)  Avoid snacking  Have a protein shake for breakfast (160 Taras, 30 g protein Premier Protein is fine).     Relating to beverages:  Reduce caffeine/carbonation/calorie containing beverages  Separate fluids from meals by 30 minutes before, during, and after eating    Relating to activity:  Increase activity " level.  Exercise (walking) 5 days/week for 50 minutes.   - swimming    Relating to cravings:  Anytime you have a craving, find an activity 15 minutes to see if craving goes away           [employer] Information     [employer] gives you secure access to your electronic health record. If you see a primary care provider, you can also send messages to your care team and make appointments. If you have questions, please call your primary care clinic.  If you do not have a primary care provider, please call [phone] and they will assist you.      [employer] is an electronic gateway that provides easy, online access to your medical records. With [employer], you can request a clinic appointment, read your test results, renew a prescription or communicate with your care team.     To access your existing account, please contact your [facility] or call [phone] for assistance.        Care EveryWhere ID     This is your Care EveryWhere ID. This could be used by other organizations to access your [facility] medical records  [id number]        Equal Access to Services     PAULINE RODRIGUEZ : Praveena Cline, wadavidda jose, qapraneethta kaalml jamison, esthlea root. So [facility] [phone].    ATENCIÓN: Si habla español, tiene a stone disposición servicios gratuitos de asistencia lingüística. Llame al [phone].    We comply with applicable federal civil rights laws and [place] laws. We do not discriminate on the basis of race, color, national origin, age, disability, sex, sexual orientation, or gender identity.

## 2018-04-23 NOTE — MR AVS SNAPSHOT
After Visit Summary   4/23/2018    Abigail Loepz    MRN: [id number]           Patient Information     Date Of Birth          1985        Visit Information        Provider Department      4/23/2018 1:00 PM Eros Brizuela MD [facility]        Today's Diagnoses     Snoring    -  1    Morbid obesity (H)        Hypertension goal BP (blood pressure) < 140/90        Tonsillar enlargement          Care Instructions      Your BMI is Body mass index is 58.89 kg/(m^2).  Weight management is a personal decision.  If you are interested in exploring weight loss strategies, the following discussion covers the approaches that may be successful. Body mass index (BMI) is one way to tell whether you are at a healthy weight, overweight, or obese. It measures your weight in relation to your height.  A BMI of 18.5 to 24.9 is in the healthy range. A person with a BMI of 25 to 29.9 is considered overweight, and someone with a BMI of 30 or greater is considered obese. More than two-thirds of American adults are considered overweight or obese.  Being overweight or obese increases the risk for further weight gain. Excess weight may lead to heart disease and diabetes.  Creating and following plans for healthy eating and physical activity may help you improve your health.  Weight control is part of healthy lifestyle and includes exercise, emotional health, and healthy eating habits. Careful eating habits lifelong are the mainstay of weight control. Though there are significant health benefits from weight loss, long-term weight loss with diet alone may be very difficult to achieve- studies show long-term success with dietary management in less than 10% of people. Attaining a healthy weight may be especially difficult to achieve in those with severe obesity. In some cases, medications, devices and surgical management might be considered.  What can you do?  If you are overweight or obese and are interested in  methods for weight loss, you should discuss this with your provider.     Consider reducing daily calorie intake by 500 calories.     Keep a food journal.     Avoiding skipping meals, consider cutting portions instead.    Diet combined with exercise helps maintain muscle while optimizing fat loss. Strength training is particularly important for building and maintaining muscle mass. Exercise helps reduce stress, increase energy, and improves fitness. Increasing exercise without diet control, however, may not burn enough calories to loose weight.       Start walking three days a week 10-20 minutes at a time    Work towards walking thirty minutes five days a week     Eventually, increase the speed of your walking for 1-2 minutes at time    In addition, we recommend that you review healthy lifestyles and methods for weight loss available through the National Institutes of Health patient information sites:  http://win.niddk.nih.gov/publications/index.htm    And look into health and wellness programs that may be available through your health insurance provider, employer, local community center, or sarah club.    Weight management plan: Patient was referred to their PCP to discuss a diet and exercise plan.              Follow-ups after your visit        Your next 10 appointments already scheduled     May 14, 2018 11:00 AM CDT   (Arrive by 10:45 AM)   NUTRITION VISIT with TERESA Hoover [facility] Surgical Weight Management ([facility])    [address] [place] [address]   [phone]            Jun 14, 2018 11:00 AM CDT   (Arrive by 10:45 AM)   NUTRITION VISIT with TERESA Castle [facility] Surgical Weight Management ([facility] [place])    [address] [place] [address]   [phone]            Jun 14, 2018 11:45 AM CDT   (Arrive by 11:30 AM)   RETURN BARIATRIC SURGERY with MARIAJOSE Guzmán  [facility] Surgical Weight Management ([facility])    [address]  [address] [facility] [address]   [phone]            Sai 15, 2018  8:00 PM CDT   PSG Titration w/TCM with BK BED 2   [facility] ([facility]    [address]  [facility] [address]   [phone]            Jun 27, 2018  3:40 PM CDT   Return Sleep Patient with Eros Brizuela MD   [facility] ([facility]    [address]  [facility] [address]   [phone]            Jul 26, 2018  9:00 AM CDT   (Arrive by 8:45 AM)   PSYCH EVALUATION with Vijaya Martel, PhD [facility] Neuropsychology ([facility])    [address]  [address] [facility] [address]   [phone]            Jul 26, 2018 11:30 AM CDT   (Arrive by 11:15 AM)   NUTRITION VISIT with Alexa Avery RD   [facility] Surgical Weight Management ([facility])    [address] [place] [address]  [address] [facility] [address]   [phone]              Future tests that were ordered for you today     Open Future Orders        Priority Expected Expires Ordered    Comprehensive Sleep Study Routine  10/20/2018 4/23/2018            Who to contact     If you have questions or need follow up information about today's clinic visit or your schedule please contact [facility] directly at [phone].  Normal or non-critical lab and imaging results will be communicated to you by MyChart, letter or phone within 4 business days after the clinic has received the results. If you do not hear from us within 7 days, please contact the clinic through MyChart or phone. If you have a critical or abnormal lab result, we will notify you by phone as soon as possible.  Submit refill requests through [employer] or call your pharmacy and they will forward the  "refill request to us. Please allow 3 business days for your refill to be completed.          Additional Information About Your Visit        [employer] Information     [employer] gives you secure access to your electronic health record. If you see a primary care provider, you can also send messages to your care team and make appointments. If you have questions, please call your primary care clinic.  If you do not have a primary care provider, please call [phone] and they will assist you.        Care EveryWhere ID     This is your Care EveryWhere ID. This could be used by other organizations to access your [place] medical records  [id number]        Your Vitals Were     Pulse Height Pulse Oximetry BMI (Body Mass Index)          100 1.702 m (5' 7\") 98% 58.89 kg/m2         Blood Pressure from Last 3 Encounters:   04/23/18 129/82   04/16/18 126/88   03/15/18 (!) 146/92    Weight from Last 3 Encounters:   04/23/18 (!) 170.6 kg (376 lb)   04/16/18 (!) 171.5 kg (378 lb)   03/15/18 (!) 172.8 kg (380 lb 14.4 oz)              We Performed the Following     SLEEP EVALUATION & MANAGEMENT REFERRAL - ADULT -[facility]   Pablito Le - [phone]          Today's Medication Changes          These changes are accurate as of 4/23/18  2:10 PM.  If you have any questions, ask your nurse or doctor.               Start taking these medicines.        Dose/Directions    zolpidem 5 MG tablet   Commonly known as:  AMBIEN   Used for:  Snoring, Morbid obesity (H), Hypertension goal BP (blood pressure) < 140/90, Tonsillar enlargement   Started by:  Eros Brizuela MD        Take tablet by mouth 15 minutes prior to sleep, for Sleep Study   Quantity:  1 tablet   Refills:  0            Where to get your medicines      Some of these will need a paper prescription and others can be bought over the counter.  Ask your nurse if you have questions.     Bring a paper prescription for each of these medications     zolpidem 5 MG " tablet                Primary Care Provider Office Phone # Fax #    Seble Anthony PA-C [phone] [address] [facility] [address]        Equal Access to Services     PAULINE RODRIGUEZ : Haddavid nati castorena tajo Somellyali, waaxda luqadaha, qaybta kaalmada adeegyada, sethela joseph maliha root. So [facility] [phone].    ATENCIÓN: Si habla español, tiene a stone disposición servicios gratuitos de asistencia lingüística. Llame al [phone].    We comply with applicable federal civil rights laws and [place] laws. We do not discriminate on the basis of race, color, national origin, age, disability, sex, sexual orientation, or gender identity.            Thank you!     Thank you for choosing [facility]  for your care. Our goal is always to provide you with excellent care. Hearing back from our patients is one way we can continue to improve our services. Please take a few minutes to complete the written survey that you may receive in the mail after your visit with us. Thank you!             Your Updated Medication List - Protect others around you: Learn how to safely use, store and throw away your medicines at www.disposemymeds.org.          This list is accurate as of 4/23/18  2:10 PM.  Always use your most recent med list.                   Brand Name Dispense Instructions for use Diagnosis    albuterol 108 (90 Base) MCG/ACT Inhaler    PROAIR HFA/PROVENTIL HFA/VENTOLIN HFA    1 Inhaler    Inhale 2 puffs into the lungs every 4 hours as needed for shortness of breath / dyspnea or wheezing    Pneumonia of left lower lobe due to infectious organism (H)       cholecalciferol 1000 units capsule    vitamin  -D    60 capsule    Take 2 capsules (2,000 Units) by mouth daily    Vitamin D deficiency       ketotifen 0.025 % Soln ophthalmic solution    ZADITOR    1 Bottle    Place 1 drop into both eyes every 12 hours    Allergic conjunctivitis, bilateral       lisinopril 20  MG tablet    PRINIVIL/ZESTRIL    90 tablet    Take 1 tablet (20 mg) by mouth daily Note increase in dose    Essential hypertension with goal blood pressure less than 140/90       topiramate 25 MG tablet    TOPAMAX    90 tablet    25mg at bedtime for week 1, 50mg at bedtime for 1 week, and 75mg at bedtime thereafter    Morbid obesity (H)       zolpidem 5 MG tablet    AMBIEN    1 tablet    Take tablet by mouth 15 minutes prior to sleep, for Sleep Study    Snoring, Morbid obesity (H), Hypertension goal BP (blood pressure) < 140/90, Tonsillar enlargement

## 2018-04-25 ENCOUNTER — MYC MEDICAL ADVICE (OUTPATIENT)
Dept: SURGERY | Facility: CLINIC | Age: 33
End: 2018-04-25

## 2018-04-25 RX ORDER — PHENTERMINE HYDROCHLORIDE 15 MG/1
15 CAPSULE ORAL EVERY MORNING
Qty: 30 CAPSULE | Refills: 1 | COMMUNITY
Start: 2018-04-25 | End: 2018-05-31 | Stop reason: DRUGHIGH

## 2018-05-14 ENCOUNTER — ALLIED HEALTH/NURSE VISIT (OUTPATIENT)
Dept: SURGERY | Facility: CLINIC | Age: 33
End: 2018-05-14
Payer: COMMERCIAL

## 2018-05-14 VITALS — DIASTOLIC BLOOD PRESSURE: 75 MMHG | HEART RATE: 77 BPM | SYSTOLIC BLOOD PRESSURE: 117 MMHG

## 2018-05-14 NOTE — PROGRESS NOTES
"Bariatric Nutrition Consultation Note    Reason For Visit: Nutrition Reassessment    Abigail Lopez is a 33 year-old presenting today for bariatric nutrition consult.   Pt is interested in laparoscopic sleeve gastrectomy with Dr. Qureshi expected surgery in August/September.  Patient is accompanied by self.  This is pt's third of 6 required nutrition visits prior to surgery. Pt referred by Nevaeh GOMEZ(3/15/18).     Coordination Note:   4/23/18: Updated task list with RD visits.    *Pt needs to work on quitting smoking.    She is interested in having weight loss surgery for the following reasons:  Unable to loose weight on her own; needing to preserve her knees.    ANTHROPOMETRICS:    Height on 3/15/18: 1.69 m (5' 6.53\").    Initial Weight on 3/15/18: 172.8 kg (380 lb 14.4 oz) with BMI of 60.49    Current Weight: 369.4 lbs  (-6.7 lbs over the past month; -11.5 lbs from initial weight)     Required weight loss goal pre-op: -38 lbs from initial consult weight (goal weight 342 lbs or less before surgery)    SUPPLEMENT INFORMATION:  multivitamin daily  Vitamin D daily for deficiency    Pt just started phentermine today.     NUTRITION HISTORY:  Progress with Previous Goals:  Relating To Eating:  Eat slowly (20-30 minutes per meal), chewing foods well (25 chews per bite/applesauce consistency) - working on it.  9\" Plate method (1/2 plate non-starchy vegetables/fruit, 1/4 plate lean protein, 1/4 plate whole grain starch - no more than 1/2 cup carb/meal) - Pt has been working on increasing vegetables.  Avoid snacking- Meeting.   Have a protein shake for breakfast (160 Taras, 30 g protein Premier Protein is fine). - Meeting.     Relating to beverages:  Reduce caffeine/carbonation/calorie containing beverages - Pt is down to 1-2 cans of diet pop/day.  Separate fluids from meals by 30 minutes before, during, and after eating- working on it.    Take vitamin D as prescribed along with Multivitamin daily.  - meeting. " "    Relating to activity:  Increase activity level.  Exercise (walking) 5 days/week for 50 minutes.  - Meeting; walking twice/day at least 5 days/week   - swimming - still hopes to do this.    Relating to cravings:  Anytime you have a craving, find an activity 15 minutes to see if craving goes away - Meeting most of the time.  She calls her friend to help her.  Her roommate is now really helping her (food prep).       Eating Habits 3/3/2018   Do you have any dietary restrictions? No   Do you currently binge eat (eat a large amount of food in a short time)? Yes   Are you an emotional eater? Yes   Do you get up to eat after falling asleep? No   What foods do you crave? sugar       NUTRITION DIAGNOSIS:  Obesity r/t long history of self-monitoring deficit and excessive energy intake aeb BMI >30. - continues.    INTERVENTION:  Intervention Provided/Education Provided: Praised Pt on weight lost thus far.  Discussed preparing for post-op diet guidelines pre-operatively, portion/calorie-control, and mindful eating.  Instructed Pt on following a modified liquid diet.  Provided Pt with information on purchasing shakes at our clinic.  Pt would like to first use up her protein shakes at home.  Gave encouragement and support.  Provided Pt with list of goals RD contact information.      Patient Understanding: good  Expected Compliance: good    GOALS:  Relating To Eating:  Eat slowly (20-30 minutes per meal), chewing foods well (25 chews per bite/applesauce consistency)  Follow the Modified Liquid Diet for weight loss:  Breakfast: Protein Shake  Lunch: 9\" plate: 3 oz lean protein + non-starchy vegetables  Supper: Protein Shake  Snack: non-starchy vegetables (no calorie-containing dips/condiments)  Beverages: at least 48-64 oz water between meals daily    *Protein Shake Criteria: no more than 250 Calories, at least 20 grams of protein, and less than 10 grams of sugar     Meal Replacement Shake Options:   M [facility] Meal Replacement " (250 Calories, 35 g protein)   Premier Protein (160 Calories, 30 g protein)  Slim Fast Advanced Nutrition (180 Calories, 20 g protein)  Muscle Milk, lactose-free, 17 oz bottle (210 Calories, 30 g protein)  Integrated Supplements, no artificial sugars (110 Calories, 20 g protein)  Quest Protein Bars (190 Calories, 20 g protein)  No Cow Protein Bar, gluten, dairy, and soy free (200 Calories, 20 g protein)    Frozen Meal Replacements  Healthy Choice  Lean Cuisine  Atkins Meals  Smart Ones    Relating to beverages:  Reduce caffeine/carbonation/calorie containing beverages  Separate fluids from meals by 30 minutes before, during, and after eating    Relating to activity:  Increase activity level.  Exercise (walking/swimming) 5 days/week for 50 minutes.     Relating to cravings:  Anytime you have a craving, find an activity 15 minutes to see if craving goes away       Time spent with patient: 30 minutes.  Alexa Avery, MS, RDN, LDN, CLT  Pager: [phone]

## 2018-05-14 NOTE — MR AVS SNAPSHOT
MRN:[id number]                      After Visit Summary   5/14/2018    Abigail Lopez    MRN: [id number]           Visit Information        Provider Department      5/14/2018 11:00 AM Alexa Avery RD M [facility] Surgical Weight Management        Your next 10 appointments already scheduled     Jun 14, 2018 11:00 AM CDT   (Arrive by 10:45 AM)   NUTRITION VISIT with TERESA Castle [facility] Surgical Weight Management ([facility])    [address] [place] [address]   [phone]            Jun 14, 2018 11:45 AM CDT   (Arrive by 11:30 AM)   RETURN BARIATRIC SURGERY with Nevaeh Castro PA-C   [facility] Management ([facility])    [address] [place] [address]   [phone]            Sai 15, 2018  8:00 PM CDT   PSG Titration w/TCM with BK BED 2   [facility] ([facility])    [address] [place] [address]   [phone]            Jun 27, 2018  3:40 PM CDT   Return Sleep Patient with Eros Brizuela MD   [facility] ([facility])    [address] [place] [address]   [phone]            Jul 26, 2018  9:00 AM CDT   (Arrive by 8:45 AM)   PSYCH EVALUATION with Vijaya Martel, PhD [facility] Neuropsychology ([facility])    [address] [place] [address]   [phone]            Jul 26, 2018 11:30 AM CDT   (Arrive by 11:15 AM)   NUTRITION VISIT with TERESA Hoover [facility] Surgical Weight Management ([facility])    [address] [place] [address]   [phone]              Care Instructions    GOALS:  Relating To Eating:  Eat slowly (20-30 minutes per meal), chewing foods well (25 chews per  "bite/applesauce consistency)  Follow the Modified Liquid Diet for weight loss:  Breakfast: Protein Shake  Lunch: 9\" plate: 3 oz lean protein + non-starchy vegetables  Supper: Protein Shake  Snack: non-starchy vegetables (no calorie-containing dips/condiments)   *You may use [employer] 0 Taras salad dressings for dip (from [employer])  Beverages: at least 48-64 oz water between meals daily    *Protein Shake Criteria: no more than 250 Calories, at least 20 grams of protein, and less than 10 grams of sugar     Meal Replacement Shake Options:   M [facility] Meal Replacement (250 Calories, 35 g protein)   Premier Protein (160 Calories, 30 g protein)  Slim Fast Advanced Nutrition (180 Calories, 20 g protein)  Muscle Milk, lactose-free, 17 oz bottle (210 Calories, 30 g protein)  Integrated Supplements, no artificial sugars (110 Calories, 20 g protein)  Quest Protein Bars (190 Calories, 20 g protein)  No Cow Protein Bar, gluten, dairy, and soy free (200 Calories, 20 g protein)    Frozen Meal Replacements  Healthy Choice  Lean Cuisine  Atkins Meals  Smart Ones    Relating to beverages:  Reduce caffeine/carbonation/calorie containing beverages  Separate fluids from meals by 30 minutes before, during, and after eating    Relating to activity:  Increase activity level.  Exercise (walking/swimming) 5 days/week for 50 minutes.     Relating to cravings:  Anytime you have a craving, find an activity 15 minutes to see if craving goes away          [employer] Information     [employer] gives you secure access to your electronic health record. If you see a primary care provider, you can also send messages to your care team and make appointments. If you have questions, please call your primary care clinic.  If you do not have a primary care provider, please call [phone] and they will assist you.      [employer] is an electronic gateway that provides easy, online access to your medical records. With [employer], you can request a clinic appointment, " read your test results, renew a prescription or communicate with your care team.     To access your existing account, please contact your [facility] or call [phone] for assistance.        Care EveryWhere ID     This is your Care EveryWhere ID. This could be used by other organizations to access your [facility] medical records  [id number]        Equal Access to Services     PAULINE RODRIGUEZ : Praveena Cline, rupert garcia, esthela antoine. So [facility] [phone].    ATENCIÓN: Si habla español, tiene a stone disposición servicios gratuitos de asistencia lingüística. Llame al [phone].    We comply with applicable federal civil rights laws and [place] laws. We do not discriminate on the basis of race, color, national origin, age, disability, sex, sexual orientation, or gender identity.

## 2018-05-14 NOTE — PATIENT INSTRUCTIONS
"GOALS:  Relating To Eating:  Eat slowly (20-30 minutes per meal), chewing foods well (25 chews per bite/applesauce consistency)  Follow the Modified Liquid Diet for weight loss:  Breakfast: Protein Shake  Lunch: 9\" plate: 3 oz lean protein + non-starchy vegetables  Supper: Protein Shake  Snack: non-starchy vegetables (no calorie-containing dips/condiments)   *You may use [employer] 0 Taras salad dressings for dip (from [employer])  Beverages: at least 48-64 oz water between meals daily    *Protein Shake Criteria: no more than 250 Calories, at least 20 grams of protein, and less than 10 grams of sugar     Meal Replacement Shake Options:   M [facility] Meal Replacement (250 Calories, 35 g protein)   Premier Protein (160 Calories, 30 g protein)  Slim Fast Advanced Nutrition (180 Calories, 20 g protein)  Muscle Milk, lactose-free, 17 oz bottle (210 Calories, 30 g protein)  Integrated Supplements, no artificial sugars (110 Calories, 20 g protein)  Quest Protein Bars (190 Calories, 20 g protein)  No Cow Protein Bar, gluten, dairy, and soy free (200 Calories, 20 g protein)    Frozen Meal Replacements  Healthy Choice  Lean Cuisine  Atkins Meals  Smart Ones    Relating to beverages:  Reduce caffeine/carbonation/calorie containing beverages  Separate fluids from meals by 30 minutes before, during, and after eating    Relating to activity:  Increase activity level.  Exercise (walking/swimming) 5 days/week for 50 minutes.     Relating to cravings:  Anytime you have a craving, find an activity 15 minutes to see if craving goes away   "

## 2018-05-30 ENCOUNTER — MYC MEDICAL ADVICE (OUTPATIENT)
Dept: SURGERY | Facility: CLINIC | Age: 33
End: 2018-05-30

## 2018-05-30 DIAGNOSIS — E66.01 MORBID OBESITY (H): Primary | ICD-10-CM

## 2018-05-31 RX ORDER — PHENTERMINE HYDROCHLORIDE 30 MG/1
30 CAPSULE ORAL EVERY MORNING
Qty: 30 CAPSULE | Refills: 1 | Status: SHIPPED | OUTPATIENT
Start: 2018-05-31 | End: 2018-06-14

## 2018-05-31 NOTE — TELEPHONE ENCOUNTER
Per kasandra Ross to increase Phentermine from 15mg to 30mg daily. Patient agreed to have BP and pulse checked after 1 week at her PCP. Will route for sign off.

## 2018-06-13 ENCOUNTER — TELEPHONE (OUTPATIENT)
Dept: SLEEP MEDICINE | Facility: CLINIC | Age: 33
End: 2018-06-13

## 2018-06-13 DIAGNOSIS — J35.1 TONSILLAR ENLARGEMENT: ICD-10-CM

## 2018-06-13 DIAGNOSIS — G47.9 DISTURBANCE IN SLEEP BEHAVIOR: Primary | ICD-10-CM

## 2018-06-13 RX ORDER — ZOLPIDEM TARTRATE 5 MG/1
TABLET ORAL
Qty: 1 TABLET | Refills: 0 | Status: SHIPPED | OUTPATIENT
Start: 2018-06-13 | End: 2018-06-27

## 2018-06-13 NOTE — TELEPHONE ENCOUNTER
Patient called and is requesting a new RX for an Ambien pill for her PSG this Friday night. She misplaced the paper RX that she was given at her appointment. She can  at [facility] pharmacy.

## 2018-06-14 ENCOUNTER — ALLIED HEALTH/NURSE VISIT (OUTPATIENT)
Dept: SURGERY | Facility: CLINIC | Age: 33
End: 2018-06-14
Payer: COMMERCIAL

## 2018-06-14 ENCOUNTER — OFFICE VISIT (OUTPATIENT)
Dept: SURGERY | Facility: CLINIC | Age: 33
End: 2018-06-14
Payer: COMMERCIAL

## 2018-06-14 VITALS
WEIGHT: 293 LBS | TEMPERATURE: 98.4 F | HEIGHT: 67 IN | DIASTOLIC BLOOD PRESSURE: 88 MMHG | OXYGEN SATURATION: 97 % | HEART RATE: 86 BPM | BODY MASS INDEX: 45.99 KG/M2 | SYSTOLIC BLOOD PRESSURE: 147 MMHG

## 2018-06-14 DIAGNOSIS — E66.01 MORBID OBESITY (H): ICD-10-CM

## 2018-06-14 RX ORDER — PHENTERMINE HYDROCHLORIDE 30 MG/1
30 CAPSULE ORAL EVERY MORNING
Qty: 30 CAPSULE | Refills: 1 | Status: SHIPPED | OUTPATIENT
Start: 2018-06-14 | End: 2018-12-31

## 2018-06-14 RX ORDER — TOPIRAMATE 25 MG/1
TABLET, FILM COATED ORAL
Qty: 90 TABLET | Refills: 3 | Status: SHIPPED | OUTPATIENT
Start: 2018-06-14 | End: 2018-12-31

## 2018-06-14 NOTE — MR AVS SNAPSHOT
After Visit Summary   6/14/2018    Abigail Lopez    MRN: [id number]           Patient Information     Date Of Birth          1985        Visit Information        Provider Department      6/14/2018 11:45 AM Nevaeh Castro PA-C [facility]        Today's Diagnoses     Morbid obesity (H)          Care Instructions    Continue phentermine 30 mg     Check blood pressure within 1 week    Restart topiramate ramp to 75 mg. Let us know if there are urinary symptoms    Complete psych eval and sleep eval    See RD in July and August    Goal to see Dr Qureshi for PBS visit in July/August. Can see when psych eval complete and within 10 lbs of goal weight  Call Jan to schedule with Dr Qureshi after psych eval [phone]    Goal to have surgery end of August/Sept    Nicotine test needed still. Plan to complete at next RD visit in July    Recommend modified liquid diet          Follow-ups after your visit        Your next 10 appointments already scheduled     Sai 15, 2018  8:00 PM CDT   PSG Titration w/TCM with BK BED 2   [facility] ([facility])    [address] [place] [id number]   [phone]            Jun 27, 2018  3:40 PM CDT   Return Sleep Patient with Eros Brizuela MD   [facility] [place]    [address] [place] [id number]   [phone]            Jul 26, 2018  9:00 AM CDT   (Arrive by 8:45 AM)   PSYCH EVALUATION with Vijaya Martel, PhD [facility] ([facility])    [address] [place] [id number]   [phone]            Jul 26, 2018 11:30 AM CDT   (Arrive by 11:15 AM)   NUTRITION VISIT with Alexa Avery RD   [facility] ([facility])    [address] [place] [address]  "[id number] [phone]              Who to contact     Please call your clinic at [phone] to:    Ask questions about your health    Make or cancel appointments    Discuss your medicines    Learn about your test results    Speak to your doctor            Additional Information About Your Visit        MyChart Information     [employer] gives you secure access to your electronic health record. If you see a primary care provider, you can also send messages to your care team and make appointments. If you have questions, please call your primary care clinic.  If you do not have a primary care provider, please call [phone] and they will assist you.      [employer] is an electronic gateway that provides easy, online access to your medical records. With [employer], you can request a clinic appointment, read your test results, renew a prescription or communicate with your care team.     To access your existing account, please contact your [facility] or call [phone] for assistance.        Care EveryWhere ID     This is your Care EveryWhere ID. This could be used by other organizations to access your [facility] medical records  [id number]        Your Vitals Were     Pulse Temperature Height Pulse Oximetry BMI (Body Mass Index)       86 98.4  F (36.9  C) (Oral) 5' 6.54\" 97% 57.96 kg/m2        Blood Pressure from Last 3 Encounters:   06/14/18 147/88   05/14/18 117/75   04/23/18 129/82    Weight from Last 3 Encounters:   06/14/18 (!) 365 lb   04/23/18 (!) 376 lb   04/16/18 (!) 378 lb              Today, you had the following     No orders found for display         Where to get your medicines      These medications were sent to [facility] [address], [address]  [address], [address], [facility] [address]     Phone:  [phone]     topiramate 25 MG tablet         Some of these will need a paper prescription and others can be bought over the counter.  " Ask your nurse if you have questions.     Bring a paper prescription for each of these medications     phentermine 30 MG capsule          Primary Care Provider Office Phone # Fax #    Seble Anthony PA-C [phone] [address] [address] [facility] [address]        Equal Access to Services     PAULINE RODRIGUEZ : Hadii aad ku hadasho Soomaali, waaxda luqadaha, qaybta kaalmada adeegyada, waxay kristinein hayjudithn adeadryan rorogasper jett . So [facility] [phone].    ATENCIÓN: Si habla español, tiene a stone disposición servicios gratuitos de asistencia lingüística. Llame al [phone].    We comply with applicable federal civil rights laws and [place] laws. We do not discriminate on the basis of race, color, national origin, age, disability, sex, sexual orientation, or gender identity.            Thank you!     Thank you for choosing [facility]  for your care. Our goal is always to provide you with excellent care. Hearing back from our patients is one way we can continue to improve our services. Please take a few minutes to complete the written survey that you may receive in the mail after your visit with us. Thank you!             Your Updated Medication List - Protect others around you: Learn how to safely use, store and throw away your medicines at www.disposemymeds.org.          This list is accurate as of 6/14/18 11:48 AM.  Always use your most recent med list.                   Brand Name Dispense Instructions for use Diagnosis    albuterol 108 (90 Base) MCG/ACT Inhaler    PROAIR HFA/PROVENTIL HFA/VENTOLIN HFA    1 Inhaler    Inhale 2 puffs into the lungs every 4 hours as needed for shortness of breath / dyspnea or wheezing    Pneumonia of left lower lobe due to infectious organism (H)       cholecalciferol 1000 units capsule    vitamin  -D    60 capsule    Take 2 capsules (2,000 Units) by mouth daily    Vitamin D deficiency       ketotifen 0.025 % Soln ophthalmic solution     ZADITOR    1 Bottle    Place 1 drop into both eyes every 12 hours    Allergic conjunctivitis, bilateral       lisinopril 20 MG tablet    PRINIVIL/ZESTRIL    90 tablet    Take 1 tablet (20 mg) by mouth daily Note increase in dose    Essential hypertension with goal blood pressure less than 140/90       phentermine 30 MG capsule     30 capsule    Take 1 capsule (30 mg) by mouth every morning    Morbid obesity (H)       topiramate 25 MG tablet    TOPAMAX    90 tablet    25mg at bedtime for week 1, 50mg at bedtime for 1 week, and 75mg at bedtime thereafter    Morbid obesity (H)       * zolpidem 5 MG tablet    AMBIEN    1 tablet    Take tablet by mouth 15 minutes prior to sleep, for Sleep Study    Snoring, Morbid obesity (H), Hypertension goal BP (blood pressure) < 140/90, Tonsillar enlargement       * zolpidem 5 MG tablet    AMBIEN    1 tablet    Take tablet by mouth 15 minutes prior to sleep, for Sleep Study    Tonsillar enlargement, BMI 50.0-59.9, adult (H), Disturbance in sleep behavior       * Notice:  This list has 2 medication(s) that are the same as other medications prescribed for you. Read the directions carefully, and ask your doctor or other care provider to review them with you.

## 2018-06-14 NOTE — NURSING NOTE
"(   Chief Complaint   Patient presents with     RECHECK     PBS,    )    ( Weight: (!) 365 lb )  ( Height: 5' 6.54\" )  ( BMI (Calculated): 58.08 )  ( Initial Weight: 380 lb 14.4 oz )  ( Cumulative weight loss (lbs): 15.9 )  ( Last Visits Weight: 380 lb 14.4 oz )  ( Wt change since last visit (lbs): -15.9 )  (   )  (   )    ( BP: 147/88 )  (   )  ( Temp: 98.4  F (36.9  C) )  ( Temp src: Oral )  ( Pulse: 86 )  (   )  ( SpO2: 97 % )    (   Patient Active Problem List   Diagnosis     BMI 50.0-59.9, adult (H)     Tobacco use disorder     Allergic rhinitis     Irregular menses     Dysmenorrhea     Menorrhagia     Health Care Home (Not Active)      CARDIOVASCULAR SCREENING; LDL GOAL LESS THAN 160     Depression with anxiety     Hypertension goal BP (blood pressure) < 140/90     Cervical high risk HPV (human papillomavirus) test positive     Tonsillar enlargement    )  (   Current Outpatient Prescriptions   Medication Sig Dispense Refill     albuterol (PROAIR HFA/PROVENTIL HFA/VENTOLIN HFA) 108 (90 BASE) MCG/ACT Inhaler Inhale 2 puffs into the lungs every 4 hours as needed for shortness of breath / dyspnea or wheezing 1 Inhaler 0     cholecalciferol (VITAMIN  -D) 1000 UNITS capsule Take 2 capsules (2,000 Units) by mouth daily 60 capsule 11     ketotifen (ZADITOR) 0.025 % SOLN Place 1 drop into both eyes every 12 hours 1 Bottle 11     lisinopril (PRINIVIL/ZESTRIL) 20 MG tablet Take 1 tablet (20 mg) by mouth daily Note increase in dose 90 tablet 1     phentermine 30 MG capsule Take 1 capsule (30 mg) by mouth every morning 30 capsule 1     topiramate (TOPAMAX) 25 MG tablet 25mg at bedtime for week 1, 50mg at bedtime for 1 week, and 75mg at bedtime thereafter (Patient not taking: Reported on 6/14/2018) 90 tablet 3     zolpidem (AMBIEN) 5 MG tablet Take tablet by mouth 15 minutes prior to sleep, for Sleep Study (Patient not taking: Reported on 6/14/2018) 1 tablet 0     zolpidem (AMBIEN) 5 MG tablet Take tablet by mouth 15 " minutes prior to sleep, for Sleep Study (Patient not taking: Reported on 6/14/2018) 1 tablet 0    )  ( Diabetes Eval:    )    ( Pain Eval:  Data Unavailable )    ( Wound Eval:       )    (   History   Smoking Status     Current Every Day Smoker     Packs/day: 0.25     Start date: 1/1/1998     Last attempt to quit: 1/31/2018   Smokeless Tobacco     Never Used     Comment: If considered for surgery I will gladly give up smoking to change my life    )    ( Signed By:  Elvia Abel; June 14, 2018; 10:48 AM )

## 2018-06-14 NOTE — PROGRESS NOTES
"Bariatric Nutrition Consultation Note    Reason For Visit: Nutrition Reassessment    Abigail Lopez is a 33 year-old presenting today for bariatric nutrition consult.   Pt is interested in laparoscopic sleeve gastrectomy with Dr. Qureshi expected surgery in August/September.  Patient is accompanied by self.  This is pt's 4th of 6 required nutrition visits prior to surgery. Pt referred by Nevaeh GOMEZ(3/15/18).     Coordination Note:   4/23/18: Updated task list with RD visits.    *Pt needs to work on quitting smoking.    She is interested in having weight loss surgery for the following reasons:  Unable to loose weight on her own; needing to preserve her knees.    ANTHROPOMETRICS:    Height on 3/15/18: 1.69 m (5' 6.53\").    Initial Weight on 3/15/18: 172.8 kg (380 lb 14.4 oz) with BMI of 60.49    Current Weight: 365 lbs  (-4.4lbs over the past month; -15.9 lbs from initial weight)     Required weight loss goal pre-op: -38 lbs from initial consult weight (goal weight 342 lbs or less before surgery)    SUPPLEMENT INFORMATION:  multivitamin daily  Vitamin D daily for deficiency    Pt just started phentermine 5/14/18.     NUTRITION HISTORY:  Progress with Previous Goals:  Relating To Eating:  Eat slowly (20-30 minutes per meal), chewing foods well (25 chews per bite/applesauce consistency) -met/continues  Follow the Modified Liquid Diet for weight loss - shake in the morning, lunch cottage cheese with sandwich if prepared -or- chix asparagus or chicken and cottage cheese    Relating to beverages:  Reduce caffeine/carbonation/calorie containing beverages - met  Separate fluids from meals by 30 minutes before, during, and after eating - meeting 50% of the time    Relating to activity:  Increase activity level.  Exercise (walking/swimming) 5 days/week for 50 minutes. - cleaning buses, building retaining wall and one short walk per week    Relating to cravings:  Anytime you have a craving, find an activity 15 minutes to " "see if craving goes away - continues    Eating Habits 3/3/2018   Do you have any dietary restrictions? No   Do you currently binge eat (eat a large amount of food in a short time)? Yes   Are you an emotional eater? Yes   Do you get up to eat after falling asleep? No   What foods do you crave? sugar       NUTRITION DIAGNOSIS:  Obesity r/t long history of self-monitoring deficit and excessive energy intake aeb BMI >30. - continues.    INTERVENTION:  Intervention Provided/Education Provided: Praised Pt on weight lost thus far.  Patient wishes to continue following the modified liquid diet next month.  Patient reported being more stressed recently.  She gave up cigarettes over the past month only sneaking 3 in the past 24 days.  Gave encouragement and support.  Provided Pt with list of goals RD contact information.      Patient Understanding: good  Expected Compliance: good    GOALS:  Relating To Eating:  Eat slowly (20-30 minutes per meal), chewing foods well (25 chews per bite/applesauce consistency)  Follow the Modified Liquid Diet for weight loss:  Breakfast: Protein Shake  Lunch: 9\" plate: 3 oz lean protein + non-starchy vegetables  Supper: Protein Shake  Snack: non-starchy vegetables (no calorie-containing dips/condiments)  Beverages: at least 48-64 oz water between meals daily    *Protein Shake Criteria: no more than 250 Calories, at least 20 grams of protein, and less than 10 grams of sugar     Meal Replacement Shake Options:   [employer] Meal Replacement (250 Calories, 35 g protein)   Premier Protein (160 Calories, 30 g protein)  Slim Fast Advanced Nutrition (180 Calories, 20 g protein)  Muscle Milk, lactose-free, 17 oz bottle (210 Calories, 30 g protein)  Integrated Supplements, no artificial sugars (110 Calories, 20 g protein)  Quest Protein Bars (190 Calories, 20 g protein)  No Cow Protein Bar, gluten, dairy, and soy free (200 Calories, 20 g protein)    Frozen Meal Replacements  Healthy Choice  Lean " Cuisine  Atkins Meals  Smart Ones    Relating to beverages:  Reduce caffeine/carbonation/calorie containing beverages  Separate fluids from meals by 30 minutes before, during, and after eating    Relating to activity:  Increase activity level.  Exercise (walking/swimming) 5 days/week for 50 minutes.     Relating to cravings:  Anytime you have a craving, find an activity 15 minutes to see if craving goes away       Time spent with patient: 15 minutes.  Randa Liriano RD, LD
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
## 2018-06-14 NOTE — PATIENT INSTRUCTIONS
Continue phentermine 30 mg     Check blood pressure within 1 week    Restart topiramate ramp to 75 mg. Let us know if there are urinary symptoms    Complete psych eval and sleep eval    See RD in July and August    Goal to see Dr Qureshi for PBS visit in July/August. Can see when psych eval complete and within 10 lbs of goal weight  Call Jan to schedule with Dr Qureshi after psych eval [phone]    Goal to have surgery end of August/Sept    Nicotine test needed still. Plan to complete at next RD visit in July    Recommend modified liquid diet

## 2018-06-15 ENCOUNTER — THERAPY VISIT (OUTPATIENT)
Dept: SLEEP MEDICINE | Facility: CLINIC | Age: 33
End: 2018-06-15
Payer: COMMERCIAL

## 2018-06-15 DIAGNOSIS — E66.01 MORBID OBESITY (H): ICD-10-CM

## 2018-06-15 DIAGNOSIS — J35.1 TONSILLAR ENLARGEMENT: ICD-10-CM

## 2018-06-15 DIAGNOSIS — I10 HYPERTENSION GOAL BP (BLOOD PRESSURE) < 140/90: Chronic | ICD-10-CM

## 2018-06-15 DIAGNOSIS — R06.83 SNORING: ICD-10-CM

## 2018-06-15 PROCEDURE — 95810 POLYSOM 6/> YRS 4/> PARAM: CPT | Performed by: INTERNAL MEDICINE

## 2018-06-15 NOTE — MR AVS SNAPSHOT
After Visit Summary   6/15/2018    Abigail Lopez    MRN: [id number]           Patient Information     Date Of Birth          1985        Visit Information        Provider Department      6/15/2018 8:00 PM BK BED 2 [facility]        Today's Diagnoses     Snoring        Morbid obesity (H)        Hypertension goal BP (blood pressure) < 140/90        Tonsillar enlargement           Follow-ups after your visit        Your next 10 appointments already scheduled     Jun 27, 2018  3:40 PM CDT   Return Sleep Patient with Eros Brizuela MD   [facility] ([facility]    [address] [place] [id number]   [phone]            Jul 26, 2018  9:00 AM CDT   (Arrive by 8:45 AM)   PSYCH EVALUATION with Vijaya Martel, PhD [facility] ([facility])    [address] [place] [address]  3rd [facility] [id number] [phone]            Jul 26, 2018 11:30 AM CDT   (Arrive by 11:15 AM)   NUTRITION VISIT with Alexa Avery RD   [facility] Surgical Weight Management ([facility])    [address] [place] [address]  4th [facility] [id number] [phone]              Who to contact     If you have questions or need follow up information about today's clinic visit or your schedule please contact [facility] directly at [phone].  Normal or non-critical lab and imaging results will be communicated to you by MyChart, letter or phone within 4 business days after the clinic has received the results. If you do not hear from us within 7 days, please contact the clinic through MyChart or phone. If you have a critical or abnormal lab result, we will notify you by phone as soon as possible.  Submit refill requests through [employer] or call your pharmacy and they will forward the refill request to us. Please allow 3 business days for your refill  to be completed.          Additional Information About Your Visit        [facility] Information     [employer] gives you secure access to your electronic health record. If you see a primary care provider, you can also send messages to your care team and make appointments. If you have questions, please call your primary care clinic.  If you do not have a primary care provider, please call [phone] and they will assist you.        Care EveryWhere ID     This is your Care EveryWhere ID. This could be used by other organizations to access your [facility] medical records  [id number]         Blood Pressure from Last 3 Encounters:   06/14/18 147/88   05/14/18 117/75   04/23/18 129/82    Weight from Last 3 Encounters:   06/14/18 (!) 165.6 kg (365 lb)   04/23/18 (!) 170.6 kg (376 lb)   04/16/18 (!) 171.5 kg (378 lb)              We Performed the Following     Comprehensive Sleep Study        Primary Care Provider Office Phone # Fax #    Seble Anthony PA-C [phone] [address] [facility] [address]        Equal Access to Services     ULISES [facility] Center[facility]IL[facility] : Hadii nati ku hadasho Soomaali, waaxda luqadaha, qaybta kaalmada adeegyakristina, esthela jett . So [facility] [phone].    ATENCIÓN: Si habla español, tiene a stone disposición servicios gratuitos de asistencia lingüística. [facility] [phone].    We comply with applicable federal civil rights laws and [place] laws. We do not discriminate on the basis of race, color, national origin, age, disability, sex, sexual orientation, or gender identity.            Thank you!     Thank you for choosing [facility]  for your care. Our goal is always to provide you with excellent care. Hearing back from our patients is one way we can continue to improve our services. Please take a few minutes to complete the written survey that you may receive in the mail after your visit with us. Thank you!             Your Updated  Medication List - Protect others around you: Learn how to safely use, store and throw away your medicines at www.disposemymeds.org.          This list is accurate as of 6/15/18 11:59 PM.  Always use your most recent med list.                   Brand Name Dispense Instructions for use Diagnosis    albuterol 108 (90 Base) MCG/ACT Inhaler    PROAIR HFA/PROVENTIL HFA/VENTOLIN HFA    1 Inhaler    Inhale 2 puffs into the lungs every 4 hours as needed for shortness of breath / dyspnea or wheezing    Pneumonia of left lower lobe due to infectious organism (H)       cholecalciferol 1000 units capsule    vitamin  -D    60 capsule    Take 2 capsules (2,000 Units) by mouth daily    Vitamin D deficiency       ketotifen 0.025 % Soln ophthalmic solution    ZADITOR    1 Bottle    Place 1 drop into both eyes every 12 hours    Allergic conjunctivitis, bilateral       lisinopril 20 MG tablet    PRINIVIL/ZESTRIL    90 tablet    Take 1 tablet (20 mg) by mouth daily Note increase in dose    Essential hypertension with goal blood pressure less than 140/90       phentermine 30 MG capsule     30 capsule    Take 1 capsule (30 mg) by mouth every morning    Morbid obesity (H)       topiramate 25 MG tablet    TOPAMAX    90 tablet    25mg at bedtime for week 1, 50mg at bedtime for 1 week, and 75mg at bedtime thereafter    Morbid obesity (H)       * zolpidem 5 MG tablet    AMBIEN    1 tablet    Take tablet by mouth 15 minutes prior to sleep, for Sleep Study    Snoring, Morbid obesity (H), Hypertension goal BP (blood pressure) < 140/90, Tonsillar enlargement       * zolpidem 5 MG tablet    AMBIEN    1 tablet    Take tablet by mouth 15 minutes prior to sleep, for Sleep Study    Tonsillar enlargement, BMI 50.0-59.9, adult (H), Disturbance in sleep behavior       * Notice:  This list has 2 medication(s) that are the same as other medications prescribed for you. Read the directions carefully, and ask your doctor or other care provider to review them  with you.

## 2018-06-16 NOTE — PROGRESS NOTES
pt arrived BK sleep ctrDiagnostic PSG completed per provider order.  Patient did not meet criteria for PAP therapy.

## 2018-06-19 PROBLEM — G47.33 OSA (OBSTRUCTIVE SLEEP APNEA): Chronic | Status: ACTIVE | Noted: 2018-06-19

## 2018-06-19 LAB — SLPCOMP: NORMAL

## 2018-06-19 NOTE — PROCEDURES
" SLEEP STUDY INTERPRETATION  DIAGNOSTIC POLYSOMNOGRAPHY REPORT      Patient: SARAH ZAPATA  YOB: 1985  Study Date: 6/15/2018  MRN: [id number]  Referring Provider: KELVIN Ross  Ordering Provider: Dr. Brizuela    Indications for Polysomnography: The patient is a 33 y old Female who is 5' 7\" and weighs 376.0 lbs. Her BMI is 59.0, [place] sleepiness scale 7.0 and neck circumference is 43.0 cm. A diagnostic polysomnogram was performed to evaluate for loud snoring, large tonsils, obesity, comorbid hypertension.     Polysomnogram Data: A full night polysomnogram recorded the standard physiologic parameters including EEG, EOG, EMG, ECG, nasal and oral airflow. Respiratory parameters of chest and abdominal movements were recorded with respiratory inductance plethysmography. Oxygen saturation was recorded by pulse oximetry. Hypopnea scoring rule used: 1B 4%.    Sleep Architecture: Alpha intrusions were noted  The total recording time of the polysomnogram was 406.3 minutes. The total sleep time was 362.5 minutes. Sleep latency was truman at 15.3 minutes with the use of a sleep aid (zolpidem). REM latency was 248.5 minutes. Arousal index was 24.7 arousals per hour. Sleep efficiency was normal at 89.2%. Wake after sleep onset was 23.5 minutes. The patient spent 9.7% of total sleep time in Stage N1, 82.9% in Stage N2, 6.6% in Stage N3, and 0.8% in REM. Time in REM supine was 3.0 minutes.    Respiration:     Events ? The polysomnogram revealed a presence of 0 obstructive, 0 central, and 0 mixed apneas resulting in an apnea index of 0 events per hour. There were 51 obstructive hypopneas and 0 central hypopneas resulting in an obstructive hypopnea index of 8.4 and central hypopnea index of 0 events per hour. The combined apnea/hypopnea index was 8.4 events per hour (central apnea/hypopnea index was 0 events per hour). The REM AHI was 20.0 events per hour. The supine AHI was 8.8 events per hour. The RERA index was " 10.6 events per hour.  The RDI was 19.0 events per hour.    Snoring - was reported as moderate.    Respiratory rate and pattern - was notable for tachypnea (respiratory rate 28 awake and 18 asleep)    Sustained Sleep Associated Hypoventilation - Transcutaneous CO2 monitoring was used, however significant hypoventilation was not suggested with a maximum change from 33 to 41 mmHg and 0 minutes greater than 55 mmHg.    Sleep Associated Hypoxemia - (Greater than 5 minutes O2 sat at or below 88%) was not present. Baseline oxygen saturation was 95.4%. Lowest oxygen saturation was 88.7%. Time spent less than or equal to 88% was 0 minutes. Time spent less than or equal to 89% was 0.1 minutes.    Movement Activity:     Periodic Limb Activity - There were 0 PLMs during the entire study.    REM EMG Activity - Excessive transient/sustained muscle activity was not present.    Nocturnal Behavior - Abnormal sleep related behaviors were not noted     Bruxism - None apparent.    Cardiac Summary:   The average pulse rate was 80.0 bpm. The minimum pulse rate was 60.9 bpm while the maximum pulse rate was 106.3 bpm.  Arrhythmias were not noted.      Assessment:     Mild obstructive sleep apnea in the supine position, moderate by RDI    Severity may be under-represented due to the paucity of REM sleep    Tachypnea    Recommendations:    Based on the presence of mild obstructive sleep apnea and Hypertension, treatment could be empirically initiated with Auto?titrating PAP therapy with a range of 5 to 18 cmH2O. Recommend clinical follow up with sleep management team.    Patient may be a candidate for dental appliance through referral to Sleep Dentistry for the treatment of obstructive sleep apnea and/or socially disruptive snoring.    If devices are not acceptable or effective, patient may benefit from evaluation of possible surgical options. If she is interested, would recommend referral to specialized ENT-Sleep provider.    Advice  regarding the risks of drowsy driving.    Suggest optimizing sleep schedule and avoiding sleep deprivation.    Weight management (if BMI > 30).    Diagnostic Codes:   Obstructive Sleep Apnea G47.33        _____________________________________   Electronically Signed By: Eros Brizuela MD 6/19/18           Range(%) Time in range (min)   0.0 - 89.0 0.1   0.0 - 88.0 -         Stage Min(mm Hg) Max(mm Hg)   Wake 31.7 40.7   NREM(1+2+3) 33.3 41.4   REM 38.6 40.3       Range(mmHg) Time in range (min)   55.0 - 100.0 -   Excluded data <20.0 & >90.0 -

## 2018-06-27 ENCOUNTER — OFFICE VISIT (OUTPATIENT)
Dept: SLEEP MEDICINE | Facility: CLINIC | Age: 33
End: 2018-06-27
Payer: COMMERCIAL

## 2018-06-27 VITALS
HEART RATE: 111 BPM | DIASTOLIC BLOOD PRESSURE: 90 MMHG | HEIGHT: 67 IN | OXYGEN SATURATION: 97 % | SYSTOLIC BLOOD PRESSURE: 124 MMHG | BODY MASS INDEX: 45.99 KG/M2 | WEIGHT: 293 LBS

## 2018-06-27 DIAGNOSIS — G47.33 OSA (OBSTRUCTIVE SLEEP APNEA): Primary | ICD-10-CM

## 2018-06-27 DIAGNOSIS — J35.1 TONSILLAR ENLARGEMENT: ICD-10-CM

## 2018-06-27 PROCEDURE — 99213 OFFICE O/P EST LOW 20 MIN: CPT | Performed by: INTERNAL MEDICINE

## 2018-06-27 NOTE — PROGRESS NOTES
Sleep Study Follow-Up Visit:    Date on this visit: 6/27/2018    Abigail Lopez comes in today for follow-up of her sleep study done on 6/15/18 at the [facility] [place] for loud snoring, large tonsils, obesity, comorbid hypertension.     She has a commercial driving license    Study Date: 6/15/2018 (376.0 lbs)   Sleep Architecture: Alpha intrusions were noted  The total recording time of the polysomnogram was 406.3 minutes. The total sleep time was 362.5 minutes. Sleep latency was truman at 15.3 minutes with the use of a sleep aid (zolpidem). REM latency was 248.5 minutes. Arousal index was 24.7 arousals per hour. Sleep efficiency was normal at 89.2%. Wake after sleep onset was 23.5 minutes. The patient spent 9.7% of total sleep time in Stage N1, 82.9% in Stage N2, 6.6% in Stage N3, and 0.8% in REM. Time in REM supine was 3.0 minutes.     Respiration:     Events ? The polysomnogram revealed a presence of 0 obstructive, 0 central, and 0 mixed apneas resulting in an apnea index of 0 events per hour. There were 51 obstructive hypopneas and 0 central hypopneas resulting in an obstructive hypopnea index of 8.4 and central hypopnea index of 0 events per hour. The combined apnea/hypopnea index was 8.4 events per hour (central apnea/hypopnea index was 0 events per hour). The REM AHI was 20.0 events per hour. The supine AHI was 8.8 events per hour. The RERA index was 10.6 events per hour.  The RDI was 19.0 events per hour.    Snoring - was reported as moderate.    Respiratory rate and pattern - was notable for tachypnea (respiratory rate 28 awake and 18 asleep)    Sustained Sleep Associated Hypoventilation - Transcutaneous CO2 monitoring was used, however significant hypoventilation was not suggested with a maximum change from 33 to 41 mmHg and 0 minutes greater than 55 mmHg.    Sleep Associated Hypoxemia - (Greater than 5 minutes O2 sat at or below 88%) was not present. Baseline oxygen saturation was  "95.4%. Lowest oxygen saturation was 88.7%. Time spent less than or equal to 88% was 0 minutes. Time spent less than or equal to 89% was 0.1 minutes.     Movement Activity:     Periodic Limb Activity - There were 0 PLMs during the entire study.    REM EMG Activity - Excessive transient/sustained muscle activity was not present.    Nocturnal Behavior - Abnormal sleep related behaviors were not noted     Bruxism - None apparent.     Cardiac Summary:   The average pulse rate was 80.0 bpm. The minimum pulse rate was 60.9 bpm while the maximum pulse rate was 106.3 bpm.  Arrhythmias were not noted.    These findings were reviewed with patient.     Past medical/surgical history, family history, social history, medications and allergies were reviewed.      Problem List:  Patient Active Problem List    Diagnosis Date Noted     DORETHA (obstructive sleep apnea)- 'mild' (AHI 8)  06/19/2018     Priority: Medium     6/15/2018 [place] [facility] Sleep Study (376.0 lbs) - AHI 8.4, RDI 19.0, Supine AHI 8.8, REM AHI 20.0 (3 minutes TST), Low O2 88.7%, Time Spent ?88% 0 minutes / Time Spent ?89% 0.1 minutes. Normal Transcutaneous CO2 Monitoring (TCM).        Tonsillar enlargement 04/23/2018     Priority: Medium     Cervical high risk HPV (human papillomavirus) test positive 11/27/2016     Priority: Medium     11/23/04: ASCUS Pap, + HR HPV  1/30/06: LSIL Pap.  4/26/06: [place] Bx - MELY 1.  Above from Care Everywhere   8/23/13: NIL Pap  11/21/2016:Pap--NIL, +High Risk type 18 & \"other\" HPV type(s) identified. Plan [place], due by 02/21/17.  3/4/17: [place] not done within 3 months. Tracking updated for 6 mo colp/pap.   2/28/18 [place] visually normal; no bx taken. NIL Pap, Neg HPV. Plan cotest in 1 year.       Hypertension goal BP (blood pressure) < 140/90 04/27/2015     Priority: Medium     Depression with anxiety      Priority: Medium     ping       CARDIOVASCULAR SCREENING; LDL GOAL LESS THAN 160 09/03/2013     Priority: Medium     Health Care Home " (Not Active)  08/28/2013     Priority: Medium     Status:  Unable to reach  Care Coordinator:  Deborah Maria    **See Letters for [facility] Care Plan           BMI 50.0-59.9, adult (H) 08/23/2013     Priority: Medium     Tobacco use disorder 08/23/2013     Priority: Medium     Allergic rhinitis 08/23/2013     Priority: Medium     Irregular menses 08/23/2013     Priority: Medium     Dysmenorrhea 08/23/2013     Priority: Medium     Menorrhagia 08/23/2013     Priority: Medium          Current Outpatient Prescriptions   Medication Sig Dispense Refill     albuterol (PROAIR HFA/PROVENTIL HFA/VENTOLIN HFA) 108 (90 BASE) MCG/ACT Inhaler Inhale 2 puffs into the lungs every 4 hours as needed for shortness of breath / dyspnea or wheezing 1 Inhaler 0     cholecalciferol (VITAMIN  -D) 1000 UNITS capsule Take 2 capsules (2,000 Units) by mouth daily 60 capsule 11     lisinopril (PRINIVIL/ZESTRIL) 20 MG tablet Take 1 tablet (20 mg) by mouth daily Note increase in dose 90 tablet 1     phentermine 30 MG capsule Take 1 capsule (30 mg) by mouth every morning 30 capsule 1     topiramate (TOPAMAX) 25 MG tablet 25mg at bedtime for week 1, 50mg at bedtime for 1 week, and 75mg at bedtime thereafter 90 tablet 3     ketotifen (ZADITOR) 0.025 % SOLN Place 1 drop into both eyes every 12 hours (Patient not taking: Reported on 6/27/2018) 1 Bottle 11        Impression/Plan:    Mild obstructive sleep apnea   Severity of sleep apnea may be underrepresented due to paucity of REM lseep.   Patient is not interested in any treatment at this time which I believe is reasonable.     Patient to follow up with Primary Care provider regarding elevated blood pressure.     Fifteen minutes spent with patient, all of which were spent face-to-face counseling, consulting, coordinating plan of care.      Eros Brizuela     CC: Seble Anthony

## 2018-06-27 NOTE — NURSING NOTE
"Chief Complaint   Patient presents with     Study Results       Initial /90  Pulse 111  Ht 1.702 m (5' 7\")  Wt (!) 162.4 kg (358 lb)  SpO2 97%  BMI 56.07 kg/m2 Estimated body mass index is 56.07 kg/(m^2) as calculated from the following:    Height as of this encounter: 1.702 m (5' 7\").    Weight as of this encounter: 162.4 kg (358 lb).    Medication Reconciliation: complete      "

## 2018-06-27 NOTE — PATIENT INSTRUCTIONS

## 2018-06-27 NOTE — MR AVS SNAPSHOT
After Visit Summary   6/27/2018    Abigail Lopez    MRN: [id number]           Patient Information     Date Of Birth          1985        Visit Information        Provider Department      6/27/2018 3:40 PM Eros Brizuela MD [facility]        Today's Diagnoses     DORETHA (obstructive sleep apnea)    -  1    Tonsillar enlargement        BMI 50.0-59.9, adult (H)          Care Instructions      Your BMI is Body mass index is 56.07 kg/(m^2).  Weight management is a personal decision.  If you are interested in exploring weight loss strategies, the following discussion covers the approaches that may be successful. Body mass index (BMI) is one way to tell whether you are at a healthy weight, overweight, or obese. It measures your weight in relation to your height.  A BMI of 18.5 to 24.9 is in the healthy range. A person with a BMI of 25 to 29.9 is considered overweight, and someone with a BMI of 30 or greater is considered obese. More than two-thirds of American adults are considered overweight or obese.  Being overweight or obese increases the risk for further weight gain. Excess weight may lead to heart disease and diabetes.  Creating and following plans for healthy eating and physical activity may help you improve your health.  Weight control is part of healthy lifestyle and includes exercise, emotional health, and healthy eating habits. Careful eating habits lifelong are the mainstay of weight control. Though there are significant health benefits from weight loss, long-term weight loss with diet alone may be very difficult to achieve- studies show long-term success with dietary management in less than 10% of people. Attaining a healthy weight may be especially difficult to achieve in those with severe obesity. In some cases, medications, devices and surgical management might be considered.  What can you do?  If you are overweight or obese and are interested in methods for weight loss, you  should discuss this with your provider.     Consider reducing daily calorie intake by 500 calories.     Keep a food journal.     Avoiding skipping meals, consider cutting portions instead.    Diet combined with exercise helps maintain muscle while optimizing fat loss. Strength training is particularly important for building and maintaining muscle mass. Exercise helps reduce stress, increase energy, and improves fitness. Increasing exercise without diet control, however, may not burn enough calories to loose weight.       Start walking three days a week 10-20 minutes at a time    Work towards walking thirty minutes five days a week     Eventually, increase the speed of your walking for 1-2 minutes at time    In addition, we recommend that you review healthy lifestyles and methods for weight loss available through the National Institutes of Health patient information sites:  http://win.niddk.nih.gov/publications/index.htm    And look into health and wellness programs that may be available through your health insurance provider, employer, local community center, or sarah club.    Weight management plan: Patient was referred to their PCP to discuss a diet and exercise plan.              Follow-ups after your visit        Follow-up notes from your care team     Return if symptoms worsen or fail to improve.      Your next 10 appointments already scheduled     Jul 26, 2018  9:00 AM CDT   (Arrive by 8:45 AM)   PSYCH EVALUATION with Vijaya Martel, PhD [facility] Neuro[facility] ([facility])    [address] [address] [phone]            Jul 26, 2018 11:30 AM CDT   (Arrive by 11:15 AM)   NUTRITION VISIT with Alexa Avery RD   [facility] ([facility])    [address] [place] [address] [phone]              Who to contact     If you have questions or need  "follow up information about today's clinic visit or your schedule please contact [facility] directly at [phone].  Normal or non-critical lab and imaging results will be communicated to you by MyChart, letter or phone within 4 business days after the clinic has received the results. If you do not hear from us within 7 days, please contact the clinic through [employer] or phone. If you have a critical or abnormal lab result, we will notify you by phone as soon as possible.  Submit refill requests through [employer] or call your pharmacy and they will forward the refill request to us. Please allow 3 business days for your refill to be completed.          Additional Information About Your Visit        [employer] Information     [employer] gives you secure access to your electronic health record. If you see a primary care provider, you can also send messages to your care team and make appointments. If you have questions, please call your primary care clinic.  If you do not have a primary care provider, please call [phone] and they will assist you.        Care EveryWhere ID     This is your Care EveryWhere ID. This could be used by other organizations to access your [place] medical records  [id number]        Your Vitals Were     Pulse Height Pulse Oximetry BMI (Body Mass Index)          111 1.702 m (5' 7\") 97% 56.07 kg/m2         Blood Pressure from Last 3 Encounters:   06/27/18 124/90   06/14/18 147/88   05/14/18 117/75    Weight from Last 3 Encounters:   06/27/18 (!) 162.4 kg (358 lb)   06/14/18 (!) 165.6 kg (365 lb)   04/23/18 (!) 170.6 kg (376 lb)              Today, you had the following     No orders found for display       Primary Care Provider Office Phone # Fax #    Seble Anthony PA-C [phone] [address] [facility] [address]        Equal Access to Services     PAULINE RODRIGUEZ AH: Praveena Cline, wadavidda lurobi, qaybta kaesthela dow " sohail oviedoadryan graff'aan ah. So [facility] [phone].    ATENCIÓN: Si marcus ely, tiene a stone disposición servicios gratuitos de asistencia lingüística. Natasha al [phone].    We comply with applicable federal civil rights laws and [place] laws. We do not discriminate on the basis of race, color, national origin, age, disability, sex, sexual orientation, or gender identity.            Thank you!     Thank you for choosing [facility]  for your care. Our goal is always to provide you with excellent care. Hearing back from our patients is one way we can continue to improve our services. Please take a few minutes to complete the written survey that you may receive in the mail after your visit with us. Thank you!             Your Updated Medication List - Protect others around you: Learn how to safely use, store and throw away your medicines at www.disposemymeds.org.          This list is accurate as of 6/27/18  4:01 PM.  Always use your most recent med list.                   Brand Name Dispense Instructions for use Diagnosis    albuterol 108 (90 Base) MCG/ACT Inhaler    PROAIR HFA/PROVENTIL HFA/VENTOLIN HFA    1 Inhaler    Inhale 2 puffs into the lungs every 4 hours as needed for shortness of breath / dyspnea or wheezing    Pneumonia of left lower lobe due to infectious organism (H)       cholecalciferol 1000 units capsule    vitamin  -D    60 capsule    Take 2 capsules (2,000 Units) by mouth daily    Vitamin D deficiency       ketotifen 0.025 % Soln ophthalmic solution    ZADITOR    1 Bottle    Place 1 drop into both eyes every 12 hours    Allergic conjunctivitis, bilateral       lisinopril 20 MG tablet    PRINIVIL/ZESTRIL    90 tablet    Take 1 tablet (20 mg) by mouth daily Note increase in dose    Essential hypertension with goal blood pressure less than 140/90       phentermine 30 MG capsule     30 capsule    Take 1 capsule (30 mg) by mouth every morning    Morbid obesity (H)        topiramate 25 MG tablet    TOPAMAX    90 tablet    25mg at bedtime for week 1, 50mg at bedtime for 1 week, and 75mg at bedtime thereafter    Morbid obesity (H)

## 2018-07-13 ENCOUNTER — CARE COORDINATION (OUTPATIENT)
Dept: SURGERY | Facility: CLINIC | Age: 33
End: 2018-07-13

## 2018-07-13 NOTE — PROGRESS NOTES
"Tasklist updated.    Bariatric Task List  Status:  Is patient a candidate for bariatric surgery?:  Yes -     Cleared to schedule surgeon consult?:    -     Status:  surgery evaluation in process -     Surgeon: Dr Qureshi -     Tentative surgery month/year: Sept 2018 -        Insurance: Insurance:  [employer] -        Patient Info: Initial Weight:  380 lb 14.4 oz -     Date of Initial Weight/Height:  3/15/2018 -     Goal Weight (lbs):  38 -     Required Weight Loss:  342 -     Surgery Type:  sleeve gastrectomy -     Multidisciplinary Meeting:    -        Dietician Visits: Structured weight loss required by insurance?:  Yes -     Dietician Visit 1:  Completed -     Dietician Visit 2:  Completed - 4/23/18 ([facility])   Dietician Visit 3:  Completed - 5/14/18 ([facility])   Dietician Visit 4:  Completed - 6/14/18   Dietician Visit 5:  Needed - July   Dietician Visit 6:  Needed - August   Dietician Visit additional:    -        Psychological Evaluation: Psych eval:  Needed - 7/26/18 psych eval with Dr Martel.      Lab Work: Complete Blood Count:  Completed -     Comprehensive Metabolic Panel:  Completed -     Vitamin D:  Completed -     Hgb A1c:  Completed -     PTH:  Completed -     Nicotine Testing:  Needed -     Other:    -        Consults/ Clearance: Sleep Medicine:  Completed - 6/27 Dr Brizuela- mild DORETHA, no tx.    Medical Weight Management: Completed - topiramate caused increased urination. started phentermine      PCP: Establish care with PCP:  Completed -     Follow up with PCP:    -     PCP letter of support:  Completed -  4/16/18 \"meeting with bariatric surgeon...continue with Bariatric specialist\" in Dr Anthony's note      Smoking: Quit tobacco use (3 months smoke free)?:  Needed -     Quit date:    -        Patient Education:  Information Session:  Completed -     Given \"Making your decision\" handout?:  Yes -     Given support group information?:  Yes -     Attended support group?:    -     Support plan in place?:  Completed " -     Research consents signed?:  Yes -        Final Tasks:  Before surgery online class:  Needed -     Before surgery online class website link:  [email or web address]   After surgery online class:  Needed -     After surgery online class website link:  [email or web address]   Nurse visit for weigh-in and information:  Needed -     Pre-assessment clinic visit with anesthesia team for H&P:  Needed -     Final labs (Hgb, plt, T&S, UA):  Needed -        Notes:   -

## 2018-07-26 ENCOUNTER — ALLIED HEALTH/NURSE VISIT (OUTPATIENT)
Dept: SURGERY | Facility: CLINIC | Age: 33
End: 2018-07-26
Payer: COMMERCIAL

## 2018-07-26 ENCOUNTER — OFFICE VISIT (OUTPATIENT)
Dept: NEUROPSYCHOLOGY | Facility: CLINIC | Age: 33
End: 2018-07-26
Payer: COMMERCIAL

## 2018-07-26 DIAGNOSIS — E66.01 MORBID OBESITY (H): ICD-10-CM

## 2018-07-26 DIAGNOSIS — F54 PSYCHOLOGICAL FACTORS AFFECTING MEDICAL CONDITION: Primary | ICD-10-CM

## 2018-07-26 NOTE — MR AVS SNAPSHOT
"                  MRN:[id number]                      After Visit Summary   7/26/2018    Abigail Lopez    MRN: [id number]           Visit Information        Provider Department      7/26/2018 11:30 AM Alexa Avery RD [facility]        Care Instructions      GOALS:  Relating To Eating:  Eat slowly (20-30 minutes per meal), chewing foods well (25 chews per bite/applesauce consistency)  Follow the Modified Liquid Diet for weight loss: (May switch shakes around to different meals depending upon work shifts.)  Breakfast: Protein Shake  Lunch: 9\" plate: 3 oz lean protein + non-starchy vegetables  Supper: Protein Shake  Snack: non-starchy vegetables (no calorie-containing dips/condiments)  Beverages: at least 48-64 oz water between meals daily    *Protein Shake Criteria: no more than 250 Calories, at least 20 grams of protein, and less than 10 grams of sugar     Meal Replacement Shake Options:    Health Meal Replacement (250 Calories, 35 g protein)   Premier Protein (160 Calories, 30 g protein)  Slim Fast Advanced Nutrition (180 Calories, 20 g protein)  Muscle Milk, lactose-free, 17 oz bottle (210 Calories, 30 g protein)  Integrated Supplements, no artificial sugars (110 Calories, 20 g protein)  Quest Protein Bars (190 Calories, 20 g protein)  No Cow Protein Bar, gluten, dairy, and soy free (200 Calories, 20 g protein)    Frozen Meal Replacements  Healthy Choice  Lean Cuisine  Atkins Meals  Smart Ones    Relating to beverages:  Reduce caffeine/carbonation/calorie containing beverages  Separate fluids from meals by 30 minutes before, during, and after eating    Relating to activity:  Increase activity level.  Exercise (walking/swimming) 5 days/week for 50 minutes.     Relating to cravings:  Anytime you have a craving, find an activity 15 minutes to see if craving goes away           CONTACT INFORMATION  Call JOE Donis at [phone]:   -If questions about completing tasks on the " Tasklist.   -To confirm if tasks are done. If tasks are done, then she can schedule  you to meet the surgeon to review risks and benefits of the surgery, the  clinic nurse for preop teaching and the dietician for postop diet teaching.   -If you have watched the on-line classes prior to meeting the surgeon, call  her or send a [employer] message to confirm that you watched them. The  online classes are at [email or web address] and  [email or web address].    Call Jan at [phone]:   -If questions about insurance and the prior authorization process.   -After meeting the surgeon to get a surgery date, schedule the  appointments with the anesthesia team and the first postop appointments.    Call the Call Center at [phone]:   -If you need to schedule additional monthly dietician visits before or after  surgery.   -If questions after surgery (ask for the clinic nurse).   -To schedule additional follow-up visits to help keep you on track (1 wk, 1  mo, 3 mo, 6  mo, every 6 months until year 5, then every year at your  anniversary month).    Fax # [phone]:    -preoperative documents to complete tasks on Tasklist.   -FMLA or return to work forms        *Your weight will be checked at your surgeon visit and also again at your anesthesia visit.  It is required that you be at or below your final weight goal up until the day of surgery or your surgery will be cancelled.    DISCLAIMER: Based on the evaluation results, the bariatric team decides whether and which bariatric surgery is the best option for you. Sometimes, even if you meet all of the pre-operative criteria, the bariatric team may still determine that in their medical judgement surgery is not recommended because of the risks to you.                   [employer] Information     [employer] gives you secure access to your electronic health record. If you see a primary care provider, you can also send messages to your care team and make appointments. If you  have questions, please call your primary care clinic.  If you do not have a primary care provider, please call [phone] and they will assist you.      [employer] is an electronic gateway that provides easy, online access to your medical records. With [employer], you can request a clinic appointment, read your test results, renew a prescription or communicate with your care team.     To access your existing account, please contact your [facility] or call [phone] for assistance.        Care EveryWhere ID     This is your Care EveryWhere ID. This could be used by other organizations to access your [place] medical records  [id number]        Equal Access to Services     PAULINE RODRIGUEZ : Praveena Cline, rupert garcia, johanna jamison, esthela root. So [facility] [phone].    ATENCIÓN: Si habla español, tiene a stone disposición servicios gratuitos de asistencia lingüística. Natasha al [phone].    We comply with applicable federal civil rights laws and [place] laws. We do not discriminate on the basis of race, color, national origin, age, disability, sex, sexual orientation, or gender identity.

## 2018-07-26 NOTE — MR AVS SNAPSHOT
After Visit Summary   7/26/2018    Abigail Lopez    MRN: [id number]           Patient Information     Date Of Birth          1985        Visit Information        Provider Department      7/26/2018 9:00 AM Vijaya Martel, PhD [facility] Neuropsychology        Today's Diagnoses     Psychological factors affecting medical condition    -  1    Morbid obesity (H)           Follow-ups after your visit        Who to contact     Please call your clinic at [phone] to:    Ask questions about your health    Make or cancel appointments    Discuss your medicines    Learn about your test results    Speak to your doctor            Additional Information About Your Visit        MyChart Information     [employer] gives you secure access to your electronic health record. If you see a primary care provider, you can also send messages to your care team and make appointments. If you have questions, please call your primary care clinic.  If you do not have a primary care provider, please call [phone] and they will assist you.      [employer] is an electronic gateway that provides easy, online access to your medical records. With [employer], you can request a clinic appointment, read your test results, renew a prescription or communicate with your care team.     To access your existing account, please contact your [facility] or call [phone] for assistance.        Care EveryWhere ID     This is your Care EveryWhere ID. This could be used by other organizations to access your [place] medical records  [id number]         Blood Pressure from Last 3 Encounters:   06/27/18 124/90   06/14/18 147/88   05/14/18 117/75    Weight from Last 3 Encounters:   06/27/18 (!) 162.4 kg (358 lb)   06/14/18 (!) 165.6 kg (365 lb)   04/23/18 (!) 170.6 kg (376 lb)              We Performed the Following     [id number] TEST BY PSYCHOLOGIST/MD, PER HR     C PSYCHIATRIC DIAGNOSTIC  EVALUATION        Primary Care Provider Office Phone # Fax #    Seble Anthony PA-C [phone] [address] [facility] [address]        Equal Access to Services     PAULINE RODRIGUEZ : Haddavid nati ku tajo Somellyali, waaxda luqadaha, qaybta kaalmada adeegyada, esthela joseph maliha root. So [facility] [phone].    ATENCIÓN: Si habla español, tiene a stone disposición servicios gratuitos de asistencia lingüística. Llame al [phone].    We comply with applicable federal civil rights laws and [place] laws. We do not discriminate on the basis of race, color, national origin, age, disability, sex, sexual orientation, or gender identity.            Thank you!     Thank you for choosing [facility] NEUROPSYCHOLOGY  for your care. Our goal is always to provide you with excellent care. Hearing back from our patients is one way we can continue to improve our services. Please take a few minutes to complete the written survey that you may receive in the mail after your visit with us. Thank you!             Your Updated Medication List - Protect others around you: Learn how to safely use, store and throw away your medicines at www.disposemymeds.org.          This list is accurate as of 7/26/18 11:59 PM.  Always use your most recent med list.                   Brand Name Dispense Instructions for use Diagnosis    albuterol 108 (90 Base) MCG/ACT inhaler    PROAIR HFA/PROVENTIL HFA/VENTOLIN HFA    1 Inhaler    Inhale 2 puffs into the lungs every 4 hours as needed for shortness of breath / dyspnea or wheezing    Pneumonia of left lower lobe due to infectious organism (H)       cholecalciferol 1000 units capsule    vitamin  -D    60 capsule    Take 2 capsules (2,000 Units) by mouth daily    Vitamin D deficiency       ketotifen 0.025 % Soln ophthalmic solution    ZADITOR    1 Bottle    Place 1 drop into both eyes every 12 hours    Allergic conjunctivitis, bilateral       lisinopril 20 MG  tablet    PRINIVIL/ZESTRIL    90 tablet    Take 1 tablet (20 mg) by mouth daily Note increase in dose    Essential hypertension with goal blood pressure less than 140/90       phentermine 30 MG capsule     30 capsule    Take 1 capsule (30 mg) by mouth every morning    Morbid obesity (H)       topiramate 25 MG tablet    TOPAMAX    90 tablet    25mg at bedtime for week 1, 50mg at bedtime for 1 week, and 75mg at bedtime thereafter    Morbid obesity (H)

## 2018-07-26 NOTE — PATIENT INSTRUCTIONS
"  GOALS:  Relating To Eating:  Eat slowly (20-30 minutes per meal), chewing foods well (25 chews per bite/applesauce consistency)  Follow the Modified Liquid Diet for weight loss: (May switch shakes around to different meals depending upon work shifts.)  Breakfast: Protein Shake  Lunch: 9\" plate: 3 oz lean protein + non-starchy vegetables  Supper: Protein Shake  Snack: non-starchy vegetables (no calorie-containing dips/condiments)  Beverages: at least 48-64 oz water between meals daily    *Protein Shake Criteria: no more than 250 Calories, at least 20 grams of protein, and less than 10 grams of sugar     Meal Replacement Shake Options:   [facility] Meal Replacement (250 Calories, 35 g protein)   Premier Protein (160 Calories, 30 g protein)  Slim Fast Advanced Nutrition (180 Calories, 20 g protein)  Muscle Milk, lactose-free, 17 oz bottle (210 Calories, 30 g protein)  Integrated Supplements, no artificial sugars (110 Calories, 20 g protein)  Quest Protein Bars (190 Calories, 20 g protein)  No Cow Protein Bar, gluten, dairy, and soy free (200 Calories, 20 g protein)    Frozen Meal Replacements  Healthy Choice  Lean Cuisine  Atkins Meals  Smart Ones    Relating to beverages:  Reduce caffeine/carbonation/calorie containing beverages  Separate fluids from meals by 30 minutes before, during, and after eating    Relating to activity:  Increase activity level.  Exercise (walking/swimming) 5 days/week for 50 minutes.     Relating to cravings:  Anytime you have a craving, find an activity 15 minutes to see if craving goes away           CONTACT INFORMATION  Call JOE Donis at [phone]:   -If questions about completing tasks on the Tasklist.   -To confirm if tasks are done. If tasks are done, then she can schedule  you to meet the surgeon to review risks and benefits of the surgery, the  clinic nurse for preop teaching and the dietician for postop diet teaching.   -If you have watched the on-line classes prior to meeting the " surgeon, call  her or send a [employer] message to confirm that you watched them. The  online classes are at [email or web address] and  [email or web address].    Call Jan at [phone]:   -If questions about insurance and the prior authorization process.   -After meeting the surgeon to get a surgery date, schedule the  appointments with the anesthesia team and the first postop appointments.    Call the Call Center at [phone]:   -If you need to schedule additional monthly dietician visits before or after  surgery.   -If questions after surgery (ask for the clinic nurse).   -To schedule additional follow-up visits to help keep you on track (1 wk, 1  mo, 3 mo, 6  mo, every 6 months until year 5, then every year at your  anniversary month).    Fax # [phone]:    -preoperative documents to complete tasks on Tasklist.   -FMLA or return to work forms        *Your weight will be checked at your surgeon visit and also again at your anesthesia visit.  It is required that you be at or below your final weight goal up until the day of surgery or your surgery will be cancelled.    DISCLAIMER: Based on the evaluation results, the bariatric team decides whether and which bariatric surgery is the best option for you. Sometimes, even if you meet all of the pre-operative criteria, the bariatric team may still determine that in their medical judgement surgery is not recommended because of the risks to you.

## 2018-07-26 NOTE — PROGRESS NOTES
"Bariatric Nutrition Consultation Note    Reason For Visit: Nutrition Reassessment    Abigail Lopez is a 33 year-old presenting today for bariatric nutrition consult.   Pt is interested in laparoscopic sleeve gastrectomy with Dr. Qureshi expected surgery in August/September.  Patient is accompanied by self.  This is pt's 5th of 6 required nutrition visits prior to surgery. Pt referred by Nevaeh GOMEZ(3/15/18).     Coordination Note:   7/30/18: Updated task list with RD visits.      She is interested in having weight loss surgery for the following reasons:  Unable to loose weight on her own; needing to preserve her knees.    ANTHROPOMETRICS:    Height on 3/15/18: 1.69 m (5' 6.53\").    Initial Weight on 3/15/18: 172.8 kg (380 lb 14.4 oz) with BMI of 60.49    Current Weight: 347.2 lbs (-17.8 lbs over the past month; -33.7 lbs from initial weight)     Required weight loss goal pre-op: -38 lbs from initial consult weight (goal weight 342 lbs or less before surgery)    SUPPLEMENT INFORMATION:  multivitamin daily  Vitamin D daily for deficiency    Pt just started phentermine 5/14/18.     NUTRITION HISTORY:  Progress with Previous Goals:  Relating To Eating:  Eat slowly (20-30 minutes per meal), chewing foods well (25 chews per bite/applesauce consistency) - Meeting.   Follow the Modified Liquid Diet for weight loss:- Meeting; depending upon the day, she may replace breakfast and lunch with a shake or breakfast and supper.   Breakfast: Protein Shake  Lunch: 9\" plate: 3 oz lean protein + non-starchy vegetables  Supper: Protein Shake  Snack: non-starchy vegetables (no calorie-containing dips/condiments)  Beverages: at least 48-64 oz water between meals daily    *Protein Shake Criteria: no more than 250 Calories, at least 20 grams of protein, and less than 10 grams of sugar     Meal Replacement Shake Options:   M Health Meal Replacement (250 Calories, 35 g protein)   Premier Protein (160 Calories, 30 g protein)  Slim Fast " Advanced Nutrition (180 Calories, 20 g protein)  Muscle Milk, lactose-free, 17 oz bottle (210 Calories, 30 g protein)  Integrated Supplements, no artificial sugars (110 Calories, 20 g protein)  Quest Protein Bars (190 Calories, 20 g protein)  No Cow Protein Bar, gluten, dairy, and soy free (200 Calories, 20 g protein)    Frozen Meal Replacements  Healthy Choice  Lean Cuisine  Atkins Meals  Smart Ones    Relating to beverages:  Reduce caffeine/carbonation/calorie containing beverages - Pt was doing one diet Mt dew or apple juice/week, but now only having water.  Separate fluids from meals by 30 minutes before, during, and after eating - Meeting; Pt stated she finally has this down.     Relating to activity:  Increase activity level.  Exercise (walking/swimming) 5 days/week for 50 minutes.  - Pt walks 30 min most mornings.  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     Relating to cravings:  Anytime you have a craving, find an activity 15 minutes to see if craving goes away - Meeting; having a non-nicotine E-cig.  Pt has been smoke-free since 7/21/18.       Eating Habits 3/3/2018   Do you have any dietary restrictions? No   Do you currently binge eat (eat a large amount of food in a short time)? Yes   Are you an emotional eater? Yes   Do you get up to eat after falling asleep? No   What foods do you crave? sugar       NUTRITION DIAGNOSIS:  Obesity r/t long history of self-monitoring deficit and excessive energy intake aeb BMI >30. - continues.    INTERVENTION:  Intervention Provided/Education Provided: Praised Pt on excellent weight loss over the past month.  Patient wishes to continue following the modified liquid diet next month.  Gave encouragement and support.  Provided Pt with list of goals RD contact information.      Patient Understanding: good  Expected Compliance: good    GOALS:  Relating To Eating:  Eat slowly  "(20-30 minutes per meal), chewing foods well (25 chews per bite/applesauce consistency)  Follow the Modified Liquid Diet for weight loss:  Breakfast: Protein Shake  Lunch: 9\" plate: 3 oz lean protein + non-starchy vegetables  Supper: Protein Shake  Snack: non-starchy vegetables (no calorie-containing dips/condiments)  Beverages: at least 48-64 oz water between meals daily    *Protein Shake Criteria: no more than 250 Calories, at least 20 grams of protein, and less than 10 grams of sugar     Meal Replacement Shake Options:   M Health Meal Replacement (250 Calories, 35 g protein)   Premier Protein (160 Calories, 30 g protein)  Slim Fast Advanced Nutrition (180 Calories, 20 g protein)  Muscle Milk, lactose-free, 17 oz bottle (210 Calories, 30 g protein)  Integrated Supplements, no artificial sugars (110 Calories, 20 g protein)  Quest Protein Bars (190 Calories, 20 g protein)  No Cow Protein Bar, gluten, dairy, and soy free (200 Calories, 20 g protein)    Frozen Meal Replacements  Healthy Choice  Lean Cuisine  Atkins Meals  Smart Ones    Relating to beverages:  Reduce caffeine/carbonation/calorie containing beverages  Separate fluids from meals by 30 minutes before, during, and after eating    Relating to activity:  Increase activity level.  Exercise (walking/swimming) 5 days/week for 50 minutes.     Relating to cravings:  Anytime you have a craving, find an activity 15 minutes to see if craving goes away       Time spent with patient: 30 minutes.  Alexa Avery, MS, RDN, LDN, CLT  Pager: [phone]      "

## 2018-07-31 ENCOUNTER — TELEPHONE (OUTPATIENT)
Dept: SURGERY | Facility: CLINIC | Age: 33
End: 2018-07-31

## 2018-07-31 NOTE — TELEPHONE ENCOUNTER
"Patient [facility] today that [facility] because [facility] potential [facility] exceeds limit.  To be \"dis-enrolled\" at the end of July.    Plan:  To cancel 8/9/18 Dr Qureshi and dietician appointment.  To cancel 8/28/18 OR date.  To call the Call Center at [phone] when new insurance information is available.  To call Jan at [phone] when she has new insurance and can reschedule appt with Dr Qureshi and RD and OR date.  "

## 2018-08-13 NOTE — PROGRESS NOTES
NAME: Abigail Lopez  MR#: [id number]  YOB: 1985  DATE OF EXAM: 7/26/2018    Neuropsychology Laboratory  [facility]  [address]  [place]  [address] [phone]    PSYCHOLOGICAL EVALUATION    RELEVANT HISTORY AND REASON FOR REFERRAL    Abigail Lopez is a 33 year old  with 10 years of formal education and a GED.   Information was obtained via interview with the patient and review of her medical records. Ms. Lopez has a history of morbid obesity, and is interested in undergoing bariatric surgery. This psychological evaluation was undertaken at the request of Nevaeh Castro PA-C, as part of the presurgical protocol, to assess personality traits and emotional functioning, as they pertain to her ability to make well-reasoned medical decisions and follow through with treatment recommendations.     EVALUATION FINDINGS    Ms. Lopez arrived 15 minutes early to her appointment and was neatly dressed and well groomed. Mood was euthymic. Speech was fluent, with normal articulation, volume, and rate. She presented her thoughts in a clear, logical manner. Memory and attention appeared to be adequate. Judgment and insight appeared to be good.    Upon interview, Ms. Lopez stated that she first considered bariatric surgery last year. Her joints started hurting and it was harder for her to get out of bed, which ultimately led her to make the phone call earlier this year to look into bariatric surgery. She had thought of the lap band procedure, but does not like the idea of a foreign object in her body. She hopes to undergo the sleeve procedure. She has a friend who had the sleeve procedure who lost almost 100 pounds. She understands that infection and dumping syndrome are risks, and that she could gain the weight back. She has spoken with her family about the procedure, and her mother is a little worried, but her aunt is a doctor and is supportive of her pursuing  the surgery. She lives with roommates, one of whom is an RN, and her mother will take a couple of days off to assist with her cares after the procedure as well.    As noted, Ms. Lopez has a longstanding history of morbid obesity. Currently, she weighs approximately 358 pounds. The most that she ever weighed was 384 pounds. She was asked to lose 38 pounds in preparation for surgery, and thinks that she has lost at least 26 pounds so far. In fact, nutrition consultation records from today indicate that she has lost 33.7 pounds from her initial weight. She first dieted around the age of 20. She had been diagnosed with hypothyroidism, although she now thinks that may have been a misdiagnosis. She was given a different medication and lost 75 pounds. She has tried the Atkins diet, she joined the [employer] and another gym, and she has kept a journal to try to lose weight. She had struggled with late-night eating, and was having too many carbs and sugars, but she has stopped doing that now. On a typical day now, she has a meal replacement for both breakfast and lunch. For dinner she may have chicken and vegetables. She snacks on watermelon or grapes. She drinks one diet Mountain Dew a week. She has not had any coffee in four months. She noted that she is doing very well with all of the behavioral changes that she needs to make, but that she is having difficulty quitting smoking. She obtained ecigs without nicotine and hopes that that will help. She denied any prior diagnosis of an eating disorder. She has felt out of control with her eating in the past, although she did not feel guilty afterwards. She might have had a Twix bar and a brownie in private, or if she made meatloaf she would have seconds along with mashed potatoes and gravy. She has not overeaten like this since before she started this program. She denied any history of purging. For exercise, she is walking twice a day, and she purposely took a second job which would  require her to walk more. She works as a dietary aid at a large facility where she has four hours when she is active in on her feet.    Ms. Lopez reported a history of depression and anxiety, first treated the age of 27. She participated in psychotherapy for a year and also took medications, last taking them about a year and a half ago. She described her mood as fine. She stated that she had been crying and worrying about everything when she was first treated for depression and anxiety, but that does not happen anymore. She rated her level of stress as a two on a scale of 1 to 10, stating that there is more stress at the beginning of the school year. She has been sleeping 6 to 7 hours a night. She stated that she does not have sleep apnea. She has not been napping during the day. Her energy level varies. Her interest level is generally good, although she is limited with her finances in August and September because she works on an academic calendar. Her motivation is good. She endorsed suicidal ideation at the age of 17 when she was taking recreational drugs, but denied any history of attempts to commit suicide, or current suicidal ideation. She denied visual or auditory hallucinations. She has never been physically or sexually abused.    Ms. Lopez stated that she has one or two alcoholic beverages once a month, down from 2 to 3 times a week. Her score on the CAGE questionnaire was three, in that she felt she needed to cut down on her drinking in her 20s, people annoyed her by criticizing her drinking, and she felt guilty about her drinking back then. She endorsed a remote history of illicit drug use including cocaine, which she used between adolescence and the age of 20, and marijuana, which she last used around the age of 25. She has never been in any chemical dependency treatment programs. As noted, she continues to smoke cigarettes but is working quitting. She has smoked for 20 years, and had been smoking one  pack a day, but has been dwindling down to four a day for the last couple of years because she was coughing. She has never been arrested. She denied excessive gambling or shopping.    In school, Ms. Lopez was never in any special education classes, nor was she held back any grades. She did well in school until her father  when she was in the 8th grade. During the 10th grade, her mother gave her the option of leaving school, so she left and earned her GED at the age of 14. She works as a , and as noted, has a second job as a dietary aide. She has never been  and has no children.    Ms. Lopez denied any history of seizure, stroke, or head injury resulting loss of consciousness. Her balance has been good and she has not been falling. She denied unilateral weakness or numbness. She has not had tremor. She experiences a few more headaches than usual but they remain rare. She has some lower back pain which she attributes to moving recently. She has been to the emergency department for pneumonia in the last year.    On the MMPI-2-RF, a self-report measure of mood and personality, Ms. Lopez responded to the items in a consistent and valid manner. Her level of emotional distress was low. She denied significant psychopathology, including depressed mood or ideation, anxiety, or psychosis.    PAST MEDICAL HISTORY: Medical records indicate a history of depression and anxiety, tobacco use disorder, mild obstructive sleep apnea, tonsillar enlargement, hypertension.    CURRENT MEDICATIONS:  Include albuterol, cholecalciferol, ketotifen, lisinopril, phentermine, and topiramate.    FAMILY MEDICAL HISTORY:  Significant for obesity on her father s side of the family, a father who  after a fall, and a mother with anxiety.    CONCLUSIONS    Abigail Lopez is a 33 year old woman with a history of morbid obesity, who is interested in undergoing bariatric surgery. She appears to be capable of comprehending  medical information and making well reasoned decisions for herself. She has a good understanding of the surgical procedure and the risks involved.    Ms. Lopez has a history of depression and anxiety, which have been managed in the past with medications and psychotherapy. Personality assessment falls within normal limits. She does not appear to be experiencing emotional problems that might interfere with her judgment or ability to follow through with treatment recommendations. She endorsed overeating, but denied disordered eating behaviors such as binging or purging. She was asked to lose 38 pounds in preparation for surgery, and records indicate that she has lost over 33 pounds so far by making changes in her diet and lifestyle. She continues to smoke about four cigarettes a day, and understands that she will be required to stop completely in preparation for surgery. Smoking cessation has been difficult for her. She was encouraged to discuss smoking cessation further with her physician, and another option would be to meet with a psychologist specifically for smoking cessation, if she feels that would be helpful. If she is able to stop smoking with another strategy, meeting with a psychologist is not required prior to surgery from a psychosocial perspective, however. She has a strong support system in her family and also her roommates, one of whom is an RN. She will likely be able to tolerate the emotional stress and physical discomfort associated with the surgery, as well as the changes in her lifestyle once the surgery is complete. Once she has demonstrated smoking cessation, there are no psychosocial contraindications to her undergoing bariatric surgery.      Vijaya Martel, Ph.D., ABPP  Licensed Psychologist, LP [id number]  Board Certified in Clinical Neuropsychology    Time spent:  One hour professional time, including interview (CPT 81761); one hour psychological assessment (CPT 04007).  ICD-10 diagnosis: F54;  E66.01.

## 2018-12-21 NOTE — PROGRESS NOTES
SUBJECTIVE:                                                    Abigail Lopez is a 33 year old female who presents to clinic today for the following health issues:    Hypertension Follow-up      Outpatient blood pressures are not being checked.    Low Salt Diet: not monitoring salt    Depression Followup    Status since last visit: Stable     See PHQ-9 for current symptoms.  Other associated symptoms: None    Complicating factors:   Significant life event:  No   Current substance abuse:  None  Anxiety or Panic symptoms:  Yes-  Anxiety over holidays    PHQ 9/28/2016 11/21/2016 2/8/2018   PHQ-9 Total Score 12 13 12   Q9: Suicide Ideation Several days Not at all Not at all       PHQ-9  English  PHQ-9   Any Language  Suicide Assessment Five-step Evaluation and Treatment (SAFE-T)    Amount of exercise or physical activity: couple hours a day    Problems taking medications regularly: Yes,  cost of medication lost insurance    Medication side effects: none    Diet: regular (no restrictions)      Patient has no insurance so did not get the bariatric surgery. Phentermine helped per patient and she wants to get back on it once she has insurance. I said I was okay doing this for up to no more than one year.     She had one bad day of depression per patient, Denies suicidal or homicidal thoughts.  Patient instructed to go to the emergency room or call 911 if these occur..  She does not want to go on  Medications. Previously tried prozac, effexor, buspar, trazadone, lexapro, and ambien for mood/sleep.  She is given crisis number. She has done therapy but can't now due to not having money/insurance.     She needs to get back on her lisinopril as she has been off. We will get labs again in the next 3 months once she has been on it.       Problem list and histories reviewed & adjusted, as indicated.  Additional history: as documented    Patient Active Problem List   Diagnosis     BMI 50.0-59.9, adult (H)     Tobacco use disorder      Allergic rhinitis     Irregular menses     Dysmenorrhea     Menorrhagia     Health Care Home (Not Active)      CARDIOVASCULAR SCREENING; LDL GOAL LESS THAN 160     Depression with anxiety     Hypertension goal BP (blood pressure) < 140/90     Cervical high risk HPV (human papillomavirus) test positive     Tonsillar enlargement     DORETHA (obstructive sleep apnea)- 'mild' (AHI 8)      Past Surgical History:   Procedure Laterality Date     BIOPSY  3/01/2018    colposcopy     DENTAL SURGERY  2000    wisdom teeth       Social History     Tobacco Use     Smoking status: Current Every Day Smoker     Packs/day: 0.25     Start date: 1998     Last attempt to quit: 2018     Years since quittin.9     Smokeless tobacco: Never Used     Tobacco comment: If considered for surgery I will gladly give up smoking to change my life   Substance Use Topics     Alcohol use: Yes     Comment: Now maybe a beer or two in a month     Family History   Problem Relation Age of Onset     Breast Cancer Mother      Eye Disorder Mother      Depression Mother         depression and anxiety     Thyroid Disease Mother      Cerebrovascular Disease Maternal Grandmother      Cancer Maternal Grandmother      Heart Disease Maternal Grandfather      Asthma Brother      Eye Disorder Brother      Depression Brother      Depression Brother      Asthma Brother      Thyroid Disease Other         mom's brother     Obesity Other      Breast Cancer Maternal Aunt      Breast Cancer Paternal Aunt      Breast Cancer Paternal Aunt      Coronary Artery Disease Cousin      Diabetes No family hx of      Colon Cancer No family hx of          Current Outpatient Medications   Medication Sig Dispense Refill     albuterol (PROAIR HFA/PROVENTIL HFA/VENTOLIN HFA) 108 (90 BASE) MCG/ACT Inhaler Inhale 2 puffs into the lungs every 4 hours as needed for shortness of breath / dyspnea or wheezing 1 Inhaler 0     ketotifen (ZADITOR) 0.025 % SOLN Place 1 drop into both eyes  every 12 hours 1 Bottle 11     lisinopril (PRINIVIL/ZESTRIL) 20 MG tablet Take 1 tablet (20 mg) by mouth daily 90 tablet 1     cholecalciferol (VITAMIN  -D) 1000 UNITS capsule Take 2 capsules (2,000 Units) by mouth daily (Patient not taking: Reported on 12/31/2018) 60 capsule 11     Allergies   Allergen Reactions     Polymyxin B Visual Disturbance     Eyes were burning and red       ROS:  Constitutional, HEENT, cardiovascular, pulmonary, GI, , musculoskeletal, neuro, skin, endocrine and psych systems are negative, except as otherwise noted.    OBJECTIVE:     /75   Pulse 107   Temp 99.4  F (37.4  C) (Oral)   Wt (!) 166.7 kg (367 lb 8 oz)   SpO2 97%   BMI 57.56 kg/m    Body mass index is 57.56 kg/m .  GENERAL: alert, no distress and obese  RESP: lungs clear to auscultation - no rales, rhonchi or wheezes  CV: regular rate and rhythm, normal S1 S2, no S3 or S4, no murmur, click or rub, no peripheral edema and peripheral pulses strong  MS: no gross musculoskeletal defects noted, no edema  PSYCH: mentation appears normal, affect normal/bright    ASSESSMENT/PLAN:     ASSESSMENT / PLAN:  (I10) Essential hypertension with goal blood pressure less than 140/90  Comment:   Plan: lisinopril (PRINIVIL/ZESTRIL) 20 MG tablet        Needs to get back on medications    Depression-see below and hpi. Not wanting treatment now per patient.     Patient Instructions   FREE AND CONFIDENTIAL 24/7 MENTAL HEALTH OR CRISIS HOTLINES:  BY COUNTY you live in:      [place]    Adult  [phone]                                           Seble Anthony PA-C  [facility]

## 2018-12-27 ENCOUNTER — TELEPHONE (OUTPATIENT)
Dept: SURGERY | Facility: CLINIC | Age: 33
End: 2018-12-27

## 2018-12-27 NOTE — TELEPHONE ENCOUNTER
I left a voicemail for Abigail today to inquire about new insurance she was to be enrolled with. She was to lose her insurance on 7/31/18. Inquired as to her doing the dietitians appointments closer to home ([place]). Requested a call back.

## 2018-12-31 ENCOUNTER — OFFICE VISIT (OUTPATIENT)
Dept: FAMILY MEDICINE | Facility: CLINIC | Age: 33
End: 2018-12-31

## 2018-12-31 VITALS
WEIGHT: 293 LBS | SYSTOLIC BLOOD PRESSURE: 138 MMHG | OXYGEN SATURATION: 97 % | HEART RATE: 107 BPM | DIASTOLIC BLOOD PRESSURE: 75 MMHG | TEMPERATURE: 99.4 F | BODY MASS INDEX: 57.56 KG/M2

## 2018-12-31 DIAGNOSIS — F41.8 DEPRESSION WITH ANXIETY: Primary | Chronic | ICD-10-CM

## 2018-12-31 DIAGNOSIS — I10 ESSENTIAL HYPERTENSION WITH GOAL BLOOD PRESSURE LESS THAN 140/90: ICD-10-CM

## 2018-12-31 PROCEDURE — 99214 OFFICE O/P EST MOD 30 MIN: CPT | Performed by: PHYSICIAN ASSISTANT

## 2018-12-31 RX ORDER — LISINOPRIL 20 MG/1
20 TABLET ORAL DAILY
Qty: 90 TABLET | Refills: 1 | Status: SHIPPED | OUTPATIENT
Start: 2018-12-31

## 2018-12-31 NOTE — PATIENT INSTRUCTIONS
FREE AND CONFIDENTIAL 24/7 MENTAL HEALTH OR CRISIS HOTLINES:  BY COUNTY you live in:      [place]    Adult  [phone]

## 2019-01-03 ENCOUNTER — MYC MEDICAL ADVICE (OUTPATIENT)
Dept: FAMILY MEDICINE | Facility: CLINIC | Age: 34
End: 2019-01-03

## 2019-01-03 DIAGNOSIS — E66.9 OBESITY, UNSPECIFIED CLASSIFICATION, UNSPECIFIED OBESITY TYPE, UNSPECIFIED WHETHER SERIOUS COMORBIDITY PRESENT: Primary | ICD-10-CM

## 2019-01-03 RX ORDER — PHENTERMINE HYDROCHLORIDE 30 MG/1
30 CAPSULE ORAL EVERY MORNING
Qty: 30 CAPSULE | Refills: 0 | Status: SHIPPED | OUTPATIENT
Start: 2019-01-03 | End: 2019-07-02

## 2019-01-03 NOTE — TELEPHONE ENCOUNTER
Printed. Ok for 1 month then f/u with blood pressure and weight check. Also please have her bring a log of her exercise habits to this appt with me.     Seble Anthony PA-C

## 2019-01-10 ENCOUNTER — TELEPHONE (OUTPATIENT)
Dept: FAMILY MEDICINE | Facility: CLINIC | Age: 34
End: 2019-01-10

## 2019-01-10 NOTE — TELEPHONE ENCOUNTER
Patient was in to see Seble Anthony and has the diagnosis of Depression and was due for a PHQ-9. Please complete, thank you.

## 2019-01-10 NOTE — LETTER
January 17, 2019    Abigail Lopez  [address] [address]      Dear Abigail,       After reviewing your chart, I have determined you are due for a PHQ-9 questionnaire. The PHQ-9 is a nine question survey tool that helps us determine how your depression is doing based on the last two weeks. Please complete the questionnaire and return in the envelope provided.          Thank you,   [facility] Team  [phone]

## 2019-03-12 ENCOUNTER — TELEPHONE (OUTPATIENT)
Dept: FAMILY MEDICINE | Facility: CLINIC | Age: 34
End: 2019-03-12

## 2019-03-12 NOTE — TELEPHONE ENCOUNTER
Pt is past due for Pap follow up  Reminder letter has been sent  LMTC her clinic with any questions or to schedule    Henny Lemos,   Pap Tracking

## 2019-04-19 ENCOUNTER — HEALTH MAINTENANCE LETTER (OUTPATIENT)
Age: 34
End: 2019-04-19

## 2019-05-03 ENCOUNTER — HEALTH MAINTENANCE LETTER (OUTPATIENT)
Age: 34
End: 2019-05-03

## 2019-07-30 ENCOUNTER — OFFICE VISIT (OUTPATIENT)
Dept: FAMILY MEDICINE | Facility: CLINIC | Age: 34
End: 2019-07-30

## 2019-07-30 VITALS
RESPIRATION RATE: 18 BRPM | HEIGHT: 67 IN | HEART RATE: 70 BPM | SYSTOLIC BLOOD PRESSURE: 128 MMHG | DIASTOLIC BLOOD PRESSURE: 84 MMHG | TEMPERATURE: 98.1 F | WEIGHT: 293 LBS | OXYGEN SATURATION: 97 % | BODY MASS INDEX: 45.99 KG/M2

## 2019-07-30 DIAGNOSIS — R19.7 DIARRHEA, UNSPECIFIED TYPE: Primary | ICD-10-CM

## 2019-07-30 PROCEDURE — 87209 SMEAR COMPLEX STAIN: CPT | Performed by: PHYSICIAN ASSISTANT

## 2019-07-30 PROCEDURE — 87329 GIARDIA AG IA: CPT | Performed by: PHYSICIAN ASSISTANT

## 2019-07-30 PROCEDURE — 87177 OVA AND PARASITES SMEARS: CPT | Performed by: PHYSICIAN ASSISTANT

## 2019-07-30 PROCEDURE — 99213 OFFICE O/P EST LOW 20 MIN: CPT | Performed by: PHYSICIAN ASSISTANT

## 2019-07-30 RX ORDER — ONDANSETRON 8 MG/1
8 TABLET, ORALLY DISINTEGRATING ORAL PRN
COMMUNITY
Start: 2019-07-24

## 2019-07-30 ASSESSMENT — MIFFLIN-ST. JEOR: SCORE: 2413.97

## 2019-07-30 NOTE — PROGRESS NOTES
Subjective     Abigail Lopez is a 34 year old female who presents to clinic today for the following health issues:    HPI   ED/UC Followup:    Facility:  [facility]  Date of visit:  2019 and 2019  Reason for visit: Abdominal pain, Vomiting, diarrhea, chills  Current Status: does not feel well, nausea, no abdominal pain today    Neg C diff and stool culture  Normal lipase, LFTs, UA, and abd CT  Elev WBC which improved     Abigail Lopez is a 34 y.o. female with history of hypertension who presents to the emergency department for evaluation of abdominal pain. The patient reports onset of nausea, vomiting, diarrhea and abdominal pain on . She was seen on  and diagnosed with gastroenteritis and prescribed Zofran. Due to continued symptoms she presents today. Denies any bloody stools, back pain, chest pain, shortness of breath, fever, urinary symptoms. No recent antibiotic use, travel or ill exposures.            Patient Active Problem List   Diagnosis     BMI 50.0-59.9, adult (H)     Tobacco use disorder     Allergic rhinitis     Irregular menses     Dysmenorrhea     Menorrhagia     Health Care Home (Not Active)      CARDIOVASCULAR SCREENING; LDL GOAL LESS THAN 160     Depression with anxiety     Hypertension goal BP (blood pressure) < 140/90     Cervical high risk HPV (human papillomavirus) test positive     Tonsillar enlargement     DORETHA (obstructive sleep apnea)- 'mild' (AHI 8)      Past Surgical History:   Procedure Laterality Date     BIOPSY  3/01/2018    colposcopy     DENTAL SURGERY  2000    wisdom teeth       Social History     Tobacco Use     Smoking status: Current Every Day Smoker     Packs/day: 0.25     Start date: 1998     Last attempt to quit: 2018     Years since quittin.4     Smokeless tobacco: Never Used     Tobacco comment: If considered for surgery I will gladly give up smoking to change my life   Substance Use Topics     Alcohol use: Yes     Comment: Now maybe a  "beer or two in a month     Family History   Problem Relation Age of Onset     Breast Cancer Mother      Eye Disorder Mother      Depression Mother         depression and anxiety     Thyroid Disease Mother      Cerebrovascular Disease Maternal Grandmother      Cancer Maternal Grandmother      Heart Disease Maternal Grandfather      Asthma Brother      Eye Disorder Brother      Depression Brother      Depression Brother      Asthma Brother      Thyroid Disease Other         mom's brother     Obesity Other      Breast Cancer Maternal Aunt      Breast Cancer Paternal Aunt      Breast Cancer Paternal Aunt      Coronary Artery Disease Cousin      Diabetes No family hx of      Colon Cancer No family hx of            Reviewed and updated as needed this visit by Provider         Review of Systems   ROS COMP: Constitutional, gi and gu systems are negative, except as otherwise noted.      Objective    /84   Pulse 70   Temp 98.1  F (36.7  C) (Tympanic)   Resp 18   Ht 1.698 m (5' 6.85\")   Wt (!) 169.4 kg (373 lb 6.4 oz)   SpO2 97%   BMI 58.75 kg/m    Body mass index is 58.75 kg/m .  Physical Exam   GENERAL: healthy, alert and no distress  MS: no gross musculoskeletal defects noted, no edema  NEURO: Normal strength and tone, mentation intact and speech normal  PSYCH: mentation appears normal, affect normal/bright          Assessment & Plan   Assessment  1. Diarrhea, unspecified type         Plan  Care Everywhere reviewed. Will obtain O&P and Giardia stool samples. If diarrhea persists longer than 2 weeks will obtain a colonoscopy. Continue Zofran PRN. Supportive therapy also discussed. Follow up if symptoms fail to improve or worsen.      Tobacco Cessation:   reports that she has been smoking.  She started smoking about 21 years ago. She has been smoking about 0.25 packs per day. She has never used smokeless tobacco.    BMI:   Estimated body mass index is 58.75 kg/m  as calculated from the following:    Height as of " "this encounter: 1.698 m (5' 6.85\").    Weight as of this encounter: 169.4 kg (373 lb 6.4 oz).     Return for follow up pending stool results.    Savita Lambert PA-C  [facility]          "

## 2019-07-31 ENCOUNTER — TELEPHONE (OUTPATIENT)
Dept: FAMILY MEDICINE | Facility: CLINIC | Age: 34
End: 2019-07-31

## 2019-07-31 LAB
G LAMBLIA AG STL QL IA: NORMAL
O+P STL MICRO: NORMAL
O+P STL MICRO: NORMAL
SPECIMEN SOURCE: NORMAL
SPECIMEN SOURCE: NORMAL

## 2019-07-31 NOTE — TELEPHONE ENCOUNTER
Spoke with patient.   RN advised results have not been read by provider yet, 1 still in process.    Routing to ordering provider to advise when results are final.    Maxine See RN BSN PHN

## 2020-02-23 ENCOUNTER — HEALTH MAINTENANCE LETTER (OUTPATIENT)
Age: 35
End: 2020-02-23

## 2020-12-12 ENCOUNTER — HEALTH MAINTENANCE LETTER (OUTPATIENT)
Age: 35
End: 2020-12-12

## 2021-04-11 ENCOUNTER — HEALTH MAINTENANCE LETTER (OUTPATIENT)
Age: 36
End: 2021-04-11

## 2021-09-26 ENCOUNTER — HEALTH MAINTENANCE LETTER (OUTPATIENT)
Age: 36
End: 2021-09-26

## 2022-05-08 ENCOUNTER — HEALTH MAINTENANCE LETTER (OUTPATIENT)
Age: 37
End: 2022-05-08

## 2023-01-08 ENCOUNTER — HEALTH MAINTENANCE LETTER (OUTPATIENT)
Age: 38
End: 2023-01-08

## 2023-06-02 ENCOUNTER — HEALTH MAINTENANCE LETTER (OUTPATIENT)
Age: 38
End: 2023-06-02

## 2024-02-11 ENCOUNTER — HEALTH MAINTENANCE LETTER (OUTPATIENT)
Age: 39
End: 2024-02-11

## 2024-08-30 NOTE — PROGRESS NOTES
Nesha Hayes,       Your recent test results are attached, if you have any questions or concerns please feel free to contact me via e-mail or call [phone].  Thyroid is normal.  Sodium, potassium, kidney and liver function normal.  Blood sugar normal, no diabetes.           It was a pleasure to see you at your recent office visit.      Sincerely,  Seble Anthony PA-C   Statement Selected